# Patient Record
Sex: MALE | NOT HISPANIC OR LATINO | Employment: FULL TIME | URBAN - METROPOLITAN AREA
[De-identification: names, ages, dates, MRNs, and addresses within clinical notes are randomized per-mention and may not be internally consistent; named-entity substitution may affect disease eponyms.]

---

## 2019-07-12 DIAGNOSIS — Z83.72 FAMILY HISTORY OF FAP (FAMILIAL ADENOMATOUS POLYPOSIS): Primary | ICD-10-CM

## 2019-07-15 ENCOUNTER — TELEPHONE (OUTPATIENT)
Dept: ONCOLOGY | Facility: CLINIC | Age: 19
End: 2019-07-15

## 2019-07-15 NOTE — TELEPHONE ENCOUNTER
ONCOLOGY INTAKE: Records Information      APPT INFORMATION:  Referring provider:  Dr. Oswald Stratton  Referring provider s clinic:  [facility]  Reason for visit/diagnosis:  Family history of FAP (familial adenomatous polyposis) [Z83.71]  Has patient been notified of appointment date and time?: NA    RECORDS INFORMATION:  Were the records received with the referral (via [employer])? No    ADDITIONAL INFORMATION:  Left VM with hours and phone. Will try again on [id number] if Pt has not been scheduled. Referral in [facility].

## 2020-05-28 ENCOUNTER — HOSPITAL ENCOUNTER (EMERGENCY)
Facility: CLINIC | Age: 20
Discharge: HOME | End: 2020-05-28
Attending: FAMILY MEDICINE | Admitting: FAMILY MEDICINE
Payer: COMMERCIAL

## 2020-05-28 ENCOUNTER — APPOINTMENT (OUTPATIENT)
Dept: CT IMAGING | Facility: CLINIC | Age: 20
End: 2020-05-28
Attending: FAMILY MEDICINE
Payer: COMMERCIAL

## 2020-05-28 VITALS
HEART RATE: 94 BPM | SYSTOLIC BLOOD PRESSURE: 106 MMHG | WEIGHT: 150 LBS | OXYGEN SATURATION: 97 % | TEMPERATURE: 98.2 F | RESPIRATION RATE: 16 BRPM | BODY MASS INDEX: 19.26 KG/M2 | DIASTOLIC BLOOD PRESSURE: 66 MMHG

## 2020-05-28 DIAGNOSIS — R10.2 PELVIC PAIN IN MALE: ICD-10-CM

## 2020-05-28 LAB
ALBUMIN SERPL-MCNC: 4.2 G/DL (ref 3.4–5)
ALBUMIN UR-MCNC: NEGATIVE MG/DL
ALP SERPL-CCNC: 50 U/L (ref 40–150)
ALT SERPL W P-5'-P-CCNC: 27 U/L (ref 0–70)
ANION GAP SERPL CALCULATED.3IONS-SCNC: 6 MMOL/L (ref 3–14)
APPEARANCE UR: CLEAR
AST SERPL W P-5'-P-CCNC: 17 U/L (ref 0–45)
BASOPHILS # BLD AUTO: 0 10E9/L (ref 0–0.2)
BASOPHILS NFR BLD AUTO: 0.7 %
BILIRUB SERPL-MCNC: 0.6 MG/DL (ref 0.2–1.3)
BILIRUB UR QL STRIP: NEGATIVE
BUN SERPL-MCNC: 12 MG/DL (ref 7–30)
CALCIUM SERPL-MCNC: 9 MG/DL (ref 8.5–10.1)
CHLORIDE SERPL-SCNC: 108 MMOL/L (ref 94–109)
CO2 SERPL-SCNC: 26 MMOL/L (ref 20–32)
COLOR UR AUTO: YELLOW
CREAT SERPL-MCNC: 0.86 MG/DL (ref 0.66–1.25)
DIFFERENTIAL METHOD BLD: ABNORMAL
EOSINOPHIL # BLD AUTO: 0.1 10E9/L (ref 0–0.7)
EOSINOPHIL NFR BLD AUTO: 2.2 %
ERYTHROCYTE [DISTWIDTH] IN BLOOD BY AUTOMATED COUNT: 11.5 % (ref 10–15)
GFR SERPL CREATININE-BSD FRML MDRD: >90 ML/MIN/{1.73_M2}
GLUCOSE SERPL-MCNC: 84 MG/DL (ref 70–99)
GLUCOSE UR STRIP-MCNC: NEGATIVE MG/DL
HCT VFR BLD AUTO: 38.1 % (ref 40–53)
HGB BLD-MCNC: 13 G/DL (ref 13.3–17.7)
HGB UR QL STRIP: NEGATIVE
IMM GRANULOCYTES # BLD: 0 10E9/L (ref 0–0.4)
IMM GRANULOCYTES NFR BLD: 0.2 %
KETONES UR STRIP-MCNC: NEGATIVE MG/DL
LEUKOCYTE ESTERASE UR QL STRIP: NEGATIVE
LIPASE SERPL-CCNC: 70 U/L (ref 73–393)
LYMPHOCYTES # BLD AUTO: 1.5 10E9/L (ref 0.8–5.3)
LYMPHOCYTES NFR BLD AUTO: 32.4 %
MCH RBC QN AUTO: 31.9 PG (ref 26.5–33)
MCHC RBC AUTO-ENTMCNC: 34.1 G/DL (ref 31.5–36.5)
MCV RBC AUTO: 94 FL (ref 78–100)
MONOCYTES # BLD AUTO: 0.5 10E9/L (ref 0–1.3)
MONOCYTES NFR BLD AUTO: 9.8 %
MUCOUS THREADS #/AREA URNS LPF: PRESENT /LPF
NEUTROPHILS # BLD AUTO: 2.5 10E9/L (ref 1.6–8.3)
NEUTROPHILS NFR BLD AUTO: 54.7 %
NITRATE UR QL: NEGATIVE
NRBC # BLD AUTO: 0 10*3/UL
NRBC BLD AUTO-RTO: 0 /100
PH UR STRIP: 6 PH (ref 5–7)
PLATELET # BLD AUTO: 172 10E9/L (ref 150–450)
POTASSIUM SERPL-SCNC: 3.9 MMOL/L (ref 3.4–5.3)
PROT SERPL-MCNC: 7.6 G/DL (ref 6.8–8.8)
RBC # BLD AUTO: 4.07 10E12/L (ref 4.4–5.9)
RBC #/AREA URNS AUTO: 0 /HPF (ref 0–2)
SODIUM SERPL-SCNC: 140 MMOL/L (ref 133–144)
SOURCE: ABNORMAL
SP GR UR STRIP: 1.01 (ref 1–1.03)
SQUAMOUS #/AREA URNS AUTO: <1 /HPF (ref 0–1)
UROBILINOGEN UR STRIP-MCNC: 0 MG/DL (ref 0–2)
WBC # BLD AUTO: 4.6 10E9/L (ref 4–11)
WBC #/AREA URNS AUTO: 0 /HPF (ref 0–5)

## 2020-05-28 PROCEDURE — 99285 EMERGENCY DEPT VISIT HI MDM: CPT | Mod: 25 | Performed by: FAMILY MEDICINE

## 2020-05-28 PROCEDURE — 81001 URINALYSIS AUTO W/SCOPE: CPT | Performed by: FAMILY MEDICINE

## 2020-05-28 PROCEDURE — 99284 EMERGENCY DEPT VISIT MOD MDM: CPT | Mod: Z6 | Performed by: FAMILY MEDICINE

## 2020-05-28 PROCEDURE — 87086 URINE CULTURE/COLONY COUNT: CPT | Performed by: FAMILY MEDICINE

## 2020-05-28 PROCEDURE — 80053 COMPREHEN METABOLIC PANEL: CPT | Performed by: FAMILY MEDICINE

## 2020-05-28 PROCEDURE — 85025 COMPLETE CBC W/AUTO DIFF WBC: CPT | Performed by: FAMILY MEDICINE

## 2020-05-28 PROCEDURE — 25000128 H RX IP 250 OP 636: Performed by: FAMILY MEDICINE

## 2020-05-28 PROCEDURE — 74177 CT ABD & PELVIS W/CONTRAST: CPT

## 2020-05-28 PROCEDURE — 83690 ASSAY OF LIPASE: CPT | Performed by: FAMILY MEDICINE

## 2020-05-28 PROCEDURE — 25000125 ZZHC RX 250: Performed by: FAMILY MEDICINE

## 2020-05-28 RX ORDER — IOPAMIDOL 755 MG/ML
74 INJECTION, SOLUTION INTRAVASCULAR ONCE
Status: COMPLETED | OUTPATIENT
Start: 2020-05-28 | End: 2020-05-28

## 2020-05-28 RX ADMIN — SODIUM CHLORIDE 58 ML: 9 INJECTION, SOLUTION INTRAVENOUS at 17:35

## 2020-05-28 RX ADMIN — IOPAMIDOL 74 ML: 755 INJECTION, SOLUTION INTRAVENOUS at 17:34

## 2020-05-28 NOTE — DISCHARGE INSTRUCTIONS
Return to the Emergency Room if the following occurs:     Severely worsened pain, vomiting/dehydration, fever >101, or for any concern at anytime.    Or, follow-up with the following provider as we discussed:     Return to your primary doctor as needed, or if not improved over the next 7 days.    Medications discussed:    Ibuprofen 600 mg every six hours for pain (7 days duration).  Tylenol 1000 mg every six hours for pain (7 days duration).  Therefore, you can alternate these every three hours and do it safely.    If you received pain-relieving or sedating medication during your time in the ER, avoid alcohol, driving automobiles, or working with machinery.  Also, a responsible adult must stay with you.        Call the Nurse Advice Line at [phone] or [phone] for any concern at anytime.

## 2020-05-28 NOTE — ED AVS SNAPSHOT
[facility] Emergency Department  [address] [facility] [address]  Phone:  [phone]  Fax:  [phone]                                    Vasquez Rizvi   MRN: [id number]    Department:  [facility] Emergency Department   Date of Visit:  5/28/2020           After Visit Summary Signature Page    I have received my discharge instructions, and my questions have been answered. I have discussed any challenges I see with this plan with the nurse or doctor.    ..........................................................................................................................................  Patient/Patient Representative Signature      ..........................................................................................................................................  Patient Representative Print Name and Relationship to Patient    ..................................................               ................................................  Date                                   Time    ..........................................................................................................................................  Reviewed by Signature/Title    ...................................................              ..............................................  Date                                               Time          22EPIC Rev 08/18
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
## 2020-05-28 NOTE — ED PROVIDER NOTES
HPI   The patient is a 19-year-old male presenting with midline pelvic pain.  No significant abdominal history reported.  No testicular history reported.  No bladder history reported.  No prior surgery involving the abdomen or pelvis.  He does use marijuana as medical management of Asperger's/anxiety/depression.  He does not drink alcohol.  He does not smoke tobacco or use chewing tobacco.    The patient recognized new onset of midline pelvic pain starting this morning at about 10:00 AM.  He describes intermittent episodes of sharp pain that comes and goes briefly, lasting seconds at a time.  He cannot point to an obvious exacerbating or relieving factor.  No radiating symptoms described.  He denies back or flank pain.  He denies testicular pain, tenderness, or swelling.  He denies dysuria, urgency, frequency, or obvious hematuria.  He denies diarrhea or constipation.  He had a regular bowel movement today at about 2:00 PM.  No hematochezia or melena reported.  No trauma or injury.  No skin rash.  He denies having similar symptoms previously.  He has had some nausea intermittently throughout the day today and it seems to correlate with the pain.  However, he does experience nausea intermittently on a regular basis as well.        Allergies:  No Known Allergies  Problem List:    Patient Active Problem List    Diagnosis Date Noted     Depression 01/07/2016     Priority: Medium      Past Medical History:    Past Medical History:   Diagnosis Date     Asperger's disorder      Depressive disorder      Generalized anxiety disorder      Past Surgical History:    No past surgical history on file.  Family History:    No family history on file.  Social History:  Marital Status:  Single [1]  Social History     Tobacco Use     Smoking status: Former Smoker     Types: Cigarettes   Substance Use Topics     Alcohol use: No     Drug use: Yes      Medications:    Cholecalciferol (VITAMIN D3 PO)  Escitalopram Oxalate (LEXAPRO  PO)  multivitamin, therapeutic with minerals (THERA-VIT-M) TABS      Review of Systems   All other systems reviewed and are negative.      PE   BP: 106/66  Pulse: 94  Temp: 98.2  F (36.8  C)  Resp: 16  Weight: 68 kg (150 lb)  SpO2: 97 %  Physical Exam  Vitals signs and nursing note reviewed.   Constitutional:       General: He is not in acute distress.     Appearance: He is not diaphoretic.      Comments: Thin, conversational, pleasant.  Answering questions appropriately.  Moving about the room without difficulty.   HENT:      Head: Atraumatic.   Eyes:      General: No scleral icterus.     Pupils: Pupils are equal, round, and reactive to light.   Neck:      Musculoskeletal: Normal range of motion.   Cardiovascular:      Rate and Rhythm: Normal rate.   Pulmonary:      Effort: No respiratory distress.   Abdominal:      Comments: The patient has a flat abdomen.  He has tenderness in the epigastrium.  He has no tenderness in the lower abdomen.  No distention.  No organomegaly or mass.   Musculoskeletal: Normal range of motion.         General: No tenderness.   Skin:     General: Skin is warm.      Findings: No rash.   Neurological:      Mental Status: He is alert and oriented to person, place, and time.   Psychiatric:         Behavior: Behavior normal.         ED COURSE and MDM   1626.  The patient has midline pelvic pain as described above.  The patient has midline epigastric tenderness.  He has had some nausea today but he has this at baseline as well.  Lab values pending.  Urine analysis.    1815.  After reviewing the initial results with the family and the patient they requested a CT scan to further clarify the cause of pain, instead of watchful waiting.  I reviewed the CT scan results with them and added a urine culture to his work-up.  No antibiotic treatment at this time.  No emergent need for hospitalization or consultation.  Consider follow-up if not improving over the weekend.  Return here for worsening as  discussed.    LABS  Labs Ordered and Resulted from Time of ED Arrival Up to the Time of Departure from the ED   ROUTINE UA WITH MICROSCOPIC REFLEX TO CULTURE - Abnormal; Notable for the following components:       Result Value    Mucous Urine Present (*)     All other components within normal limits   CBC WITH PLATELETS DIFFERENTIAL - Abnormal; Notable for the following components:    RBC Count 4.07 (*)     Hemoglobin 13.0 (*)     Hematocrit 38.1 (*)     All other components within normal limits   LIPASE - Abnormal; Notable for the following components:    Lipase 70 (*)     All other components within normal limits   COMPREHENSIVE METABOLIC PANEL   URINE CULTURE AEROBIC BACTERIAL       IMAGING  Images reviewed by me.  Radiology report also reviewed.  Abd/pelvis CT, IV contrast only TRAUMA  / AAA   Final Result   IMPRESSION:    1.  Mild circumferential urinary bladder wall thickening correlate for cystitis. Trace free fluid.             Procedures    Medications   iopamidol (ISOVUE-370) solution 74 mL (74 mLs Intravenous Given 5/28/20 1734)   sodium chloride 0.9 % bag 500mL for CT scan flush use (58 mLs Intravenous Given 5/28/20 1735)         IMPRESSION       ICD-10-CM    1. Pelvic pain in male  R10.2             Medication List      There are no discharge medications for this visit.                       Lance Acevedo MD  05/28/20 9848

## 2020-05-29 LAB
BACTERIA SPEC CULT: NO GROWTH
Lab: NORMAL
SPECIMEN SOURCE: NORMAL

## 2021-11-10 ENCOUNTER — HOSPITAL ENCOUNTER (EMERGENCY)
Facility: CLINIC | Age: 21
Discharge: HOME | End: 2021-11-11
Attending: FAMILY MEDICINE | Admitting: FAMILY MEDICINE
Payer: COMMERCIAL

## 2021-11-10 VITALS
OXYGEN SATURATION: 98 % | RESPIRATION RATE: 16 BRPM | SYSTOLIC BLOOD PRESSURE: 111 MMHG | BODY MASS INDEX: 21.2 KG/M2 | HEIGHT: 73 IN | HEART RATE: 60 BPM | WEIGHT: 160 LBS | DIASTOLIC BLOOD PRESSURE: 68 MMHG | TEMPERATURE: 98.9 F

## 2021-11-10 DIAGNOSIS — R07.9 CHEST PAIN, UNSPECIFIED TYPE: ICD-10-CM

## 2021-11-10 PROCEDURE — 93010 ELECTROCARDIOGRAM REPORT: CPT | Performed by: FAMILY MEDICINE

## 2021-11-10 PROCEDURE — 99284 EMERGENCY DEPT VISIT MOD MDM: CPT | Mod: 25 | Performed by: FAMILY MEDICINE

## 2021-11-10 PROCEDURE — 99285 EMERGENCY DEPT VISIT HI MDM: CPT | Performed by: FAMILY MEDICINE

## 2021-11-10 PROCEDURE — 93005 ELECTROCARDIOGRAM TRACING: CPT | Performed by: FAMILY MEDICINE

## 2021-11-10 ASSESSMENT — MIFFLIN-ST. JEOR: SCORE: 1784.64

## 2021-11-11 ENCOUNTER — APPOINTMENT (OUTPATIENT)
Dept: GENERAL RADIOLOGY | Facility: CLINIC | Age: 21
End: 2021-11-11
Attending: FAMILY MEDICINE
Payer: COMMERCIAL

## 2021-11-11 ENCOUNTER — ANCILLARY PROCEDURE (OUTPATIENT)
Dept: ULTRASOUND IMAGING | Facility: CLINIC | Age: 21
End: 2021-11-11
Attending: FAMILY MEDICINE
Payer: COMMERCIAL

## 2021-11-11 PROCEDURE — 71046 X-RAY EXAM CHEST 2 VIEWS: CPT

## 2021-11-11 NOTE — ED NOTES
"intermittent L sided \"pinching\" chest pain that came on 1st about 11am was not exerting self    Activity sometimes worsens     No injury no cough    Is a smoker  "

## 2021-11-11 NOTE — DISCHARGE INSTRUCTIONS
RETURN TO THE EMERGENCY ROOM FOR THE FOLLOWING:    Severely worsened pain, fainting, new or worsened breathing, or at anytime for any concern.    FOLLOW UP:    Consider having a Zio patch placed.  See contact information provided for scheduling.  Follow-up with your primary physician after the Zio patch is complete and interpreted.    TREATMENT RECOMMENDATIONS:    None new.  No changes.    NURSE ADVICE LINE:  [phone] or [phone]

## 2021-11-11 NOTE — ED TRIAGE NOTES
Patient reports intermittent left chest pain that started ~11AM while at work. Has had pain similar in the past but was not evaluated for it. Denies N/VD, dizziness or SOB. No pain at this time. No cardiac hx.
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
## 2022-11-29 NOTE — PATIENT INSTRUCTIONS
Take Pepcid twice daily for a couple weeks to see if stomach issues subside.  If that does not work, try some pepto bismol to improve stomach symptoms.  If symptoms persist, follow-up in clinic, we may have to order a test to look at the stomach.  Watch for black stools or coughing up blood or coffee grounds and follow-up in clinic right away.    Make appointment with psychiatry to discuss anxiety/depression/ADHD.  Make appointment with Physical Therapy for neck stretches to reduce symptoms.    7.  Labs today.  We will follow-up with you on lab results.      Preventive Health Recommendations  Male Ages 21 - 25     Yearly exam:             See your health care provider every year in order to  o   Review health changes.   o   Discuss preventive care.    o   Review your medicines if your doctor has prescribed any.  You should be tested each year for STDs (sexually transmitted diseases).   Talk to your provider about cholesterol testing.    If you are at risk for diabetes, you should have a diabetes test (fasting glucose).    Shots: Get a flu shot each year. Get a tetanus shot every 10 years.     Nutrition:  Eat at least 5 servings of fruits and vegetables daily.   Eat whole-grain bread, whole-wheat pasta and brown rice instead of white grains and rice.   Get adequate calcium and Vitamin D.     Lifestyle  Exercise for at least 150 minutes a week (30 minutes a day, 5 days a week). This will help you control your weight and prevent disease.   Limit alcohol to one drink per day.   No smoking.   Wear sunscreen to prevent skin cancer.   See your dentist every six months for an exam and cleaning.   See your eye doctor every 2 years

## 2022-12-01 ENCOUNTER — OFFICE VISIT (OUTPATIENT)
Dept: FAMILY MEDICINE | Facility: CLINIC | Age: 22
End: 2022-12-01
Payer: COMMERCIAL

## 2022-12-01 VITALS
RESPIRATION RATE: 16 BRPM | HEIGHT: 73 IN | WEIGHT: 168.6 LBS | SYSTOLIC BLOOD PRESSURE: 122 MMHG | HEART RATE: 81 BPM | BODY MASS INDEX: 22.35 KG/M2 | OXYGEN SATURATION: 99 % | DIASTOLIC BLOOD PRESSURE: 58 MMHG

## 2022-12-01 DIAGNOSIS — K29.70 GASTRITIS WITHOUT BLEEDING, UNSPECIFIED CHRONICITY, UNSPECIFIED GASTRITIS TYPE: ICD-10-CM

## 2022-12-01 DIAGNOSIS — Z11.4 SCREENING FOR HIV (HUMAN IMMUNODEFICIENCY VIRUS): ICD-10-CM

## 2022-12-01 DIAGNOSIS — F19.20 POLYSUBSTANCE DEPENDENCE INCLUDING OPIOID TYPE DRUG, EPISODIC ABUSE (H): ICD-10-CM

## 2022-12-01 DIAGNOSIS — F11.20 POLYSUBSTANCE DEPENDENCE INCLUDING OPIOID TYPE DRUG, EPISODIC ABUSE (H): ICD-10-CM

## 2022-12-01 DIAGNOSIS — F84.5 ASPERGER'S SYNDROME: ICD-10-CM

## 2022-12-01 DIAGNOSIS — F32.A ANXIETY AND DEPRESSION: ICD-10-CM

## 2022-12-01 DIAGNOSIS — S16.1XXA STRAIN OF NECK MUSCLE, INITIAL ENCOUNTER: ICD-10-CM

## 2022-12-01 DIAGNOSIS — F41.9 ANXIETY AND DEPRESSION: ICD-10-CM

## 2022-12-01 DIAGNOSIS — F33.1 MODERATE EPISODE OF RECURRENT MAJOR DEPRESSIVE DISORDER (H): ICD-10-CM

## 2022-12-01 DIAGNOSIS — Z11.59 NEED FOR HEPATITIS C SCREENING TEST: ICD-10-CM

## 2022-12-01 DIAGNOSIS — Z00.00 ROUTINE GENERAL MEDICAL EXAMINATION AT A HEALTH CARE FACILITY: Primary | ICD-10-CM

## 2022-12-01 PROCEDURE — 90472 IMMUNIZATION ADMIN EACH ADD: CPT | Performed by: NURSE PRACTITIONER

## 2022-12-01 PROCEDURE — 99385 PREV VISIT NEW AGE 18-39: CPT | Mod: 25 | Performed by: NURSE PRACTITIONER

## 2022-12-01 PROCEDURE — 36415 COLL VENOUS BLD VENIPUNCTURE: CPT | Performed by: NURSE PRACTITIONER

## 2022-12-01 PROCEDURE — 99213 OFFICE O/P EST LOW 20 MIN: CPT | Mod: 25 | Performed by: NURSE PRACTITIONER

## 2022-12-01 PROCEDURE — 86803 HEPATITIS C AB TEST: CPT | Performed by: NURSE PRACTITIONER

## 2022-12-01 PROCEDURE — 90471 IMMUNIZATION ADMIN: CPT | Performed by: NURSE PRACTITIONER

## 2022-12-01 PROCEDURE — 87389 HIV-1 AG W/HIV-1&-2 AB AG IA: CPT | Performed by: NURSE PRACTITIONER

## 2022-12-01 PROCEDURE — 90715 TDAP VACCINE 7 YRS/> IM: CPT | Performed by: NURSE PRACTITIONER

## 2022-12-01 PROCEDURE — 90686 IIV4 VACC NO PRSV 0.5 ML IM: CPT | Performed by: NURSE PRACTITIONER

## 2022-12-01 ASSESSMENT — ENCOUNTER SYMPTOMS
NAUSEA: 0
PARESTHESIAS: 0
SORE THROAT: 0
HEADACHES: 0
HEMATOCHEZIA: 0
HEARTBURN: 0
ABDOMINAL PAIN: 0
SHORTNESS OF BREATH: 0
CHILLS: 0
PALPITATIONS: 0
HEMATURIA: 0
DIZZINESS: 0
COUGH: 0
DIARRHEA: 0
FEVER: 0
EYE PAIN: 0
ARTHRALGIAS: 1
WEAKNESS: 0
DYSURIA: 0
CONSTIPATION: 0
MYALGIAS: 0
JOINT SWELLING: 0
NERVOUS/ANXIOUS: 1
FREQUENCY: 0

## 2022-12-01 ASSESSMENT — PATIENT HEALTH QUESTIONNAIRE - PHQ9
SUM OF ALL RESPONSES TO PHQ QUESTIONS 1-9: 14
10. IF YOU CHECKED OFF ANY PROBLEMS, HOW DIFFICULT HAVE THESE PROBLEMS MADE IT FOR YOU TO DO YOUR WORK, TAKE CARE OF THINGS AT HOME, OR GET ALONG WITH OTHER PEOPLE: SOMEWHAT DIFFICULT
SUM OF ALL RESPONSES TO PHQ QUESTIONS 1-9: 14
5. POOR APPETITE OR OVEREATING: MORE THAN HALF THE DAYS

## 2022-12-01 ASSESSMENT — ANXIETY QUESTIONNAIRES
7. FEELING AFRAID AS IF SOMETHING AWFUL MIGHT HAPPEN: NEARLY EVERY DAY
1. FEELING NERVOUS, ANXIOUS, OR ON EDGE: MORE THAN HALF THE DAYS
GAD7 TOTAL SCORE: 18
GAD7 TOTAL SCORE: 18
6. BECOMING EASILY ANNOYED OR IRRITABLE: NEARLY EVERY DAY
5. BEING SO RESTLESS THAT IT IS HARD TO SIT STILL: NEARLY EVERY DAY
2. NOT BEING ABLE TO STOP OR CONTROL WORRYING: MORE THAN HALF THE DAYS
IF YOU CHECKED OFF ANY PROBLEMS ON THIS QUESTIONNAIRE, HOW DIFFICULT HAVE THESE PROBLEMS MADE IT FOR YOU TO DO YOUR WORK, TAKE CARE OF THINGS AT HOME, OR GET ALONG WITH OTHER PEOPLE: SOMEWHAT DIFFICULT
3. WORRYING TOO MUCH ABOUT DIFFERENT THINGS: NEARLY EVERY DAY

## 2022-12-01 ASSESSMENT — PAIN SCALES - GENERAL: PAINLEVEL: SEVERE PAIN (7)

## 2022-12-01 NOTE — LETTER
December 5, 2022      Vasquez Rizvi  [address]   [place] [address]        Dear ,    We are writing to inform you of your test results.      Hep C and HIV labs are negative.     Resulted Orders   HIV Antigen Antibody Combo   Result Value Ref Range    HIV Antigen Antibody Combo Nonreactive Nonreactive      Comment:      HIV-1 p24 Ag & HIV-1/HIV-2 Ab Not Detected   Hepatitis C Screen Reflex to HCV RNA Quant and Genotype   Result Value Ref Range    Hepatitis C Antibody Nonreactive Nonreactive    Narrative    Assay performance characteristics have not been established for newborns, infants, and children.       If you have any questions or concerns, please call the clinic at the number listed above.       Sincerely,      Danica Fortune NP

## 2022-12-02 LAB
HCV AB SERPL QL IA: NONREACTIVE
HIV 1+2 AB+HIV1 P24 AG SERPL QL IA: NONREACTIVE

## 2022-12-23 ENCOUNTER — HOSPITAL ENCOUNTER (OUTPATIENT)
Dept: PHYSICAL THERAPY | Facility: CLINIC | Age: 22
Setting detail: THERAPIES SERIES
Discharge: HOME | End: 2022-12-23
Attending: NURSE PRACTITIONER
Payer: COMMERCIAL

## 2022-12-23 DIAGNOSIS — S16.1XXA STRAIN OF NECK MUSCLE, INITIAL ENCOUNTER: ICD-10-CM

## 2022-12-23 PROCEDURE — 97140 MANUAL THERAPY 1/> REGIONS: CPT | Mod: GP | Performed by: PHYSICAL THERAPIST

## 2022-12-23 PROCEDURE — 97161 PT EVAL LOW COMPLEX 20 MIN: CPT | Mod: GP | Performed by: PHYSICAL THERAPIST

## 2022-12-23 PROCEDURE — 97110 THERAPEUTIC EXERCISES: CPT | Mod: GP | Performed by: PHYSICAL THERAPIST
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
## 2023-01-05 ENCOUNTER — VIRTUAL VISIT (OUTPATIENT)
Dept: PSYCHIATRY | Facility: CLINIC | Age: 23
End: 2023-01-05
Attending: NURSE PRACTITIONER

## 2023-01-05 ENCOUNTER — PATIENT OUTREACH (OUTPATIENT)
Dept: CARE COORDINATION | Facility: CLINIC | Age: 23
End: 2023-01-05
Payer: COMMERCIAL

## 2023-01-05 ENCOUNTER — VIRTUAL VISIT (OUTPATIENT)
Dept: BEHAVIORAL HEALTH | Facility: CLINIC | Age: 23
End: 2023-01-05

## 2023-01-05 DIAGNOSIS — F33.1 DEPRESSION, MAJOR, RECURRENT, MODERATE (H): Primary | ICD-10-CM

## 2023-01-05 DIAGNOSIS — F84.0 AUTISM DISORDER: ICD-10-CM

## 2023-01-05 DIAGNOSIS — F19.20 POLYSUBSTANCE DEPENDENCE INCLUDING OPIOID TYPE DRUG, EPISODIC ABUSE (H): ICD-10-CM

## 2023-01-05 DIAGNOSIS — F90.2 ATTENTION DEFICIT HYPERACTIVITY DISORDER (ADHD), COMBINED TYPE: Primary | ICD-10-CM

## 2023-01-05 DIAGNOSIS — F33.1 MODERATE EPISODE OF RECURRENT MAJOR DEPRESSIVE DISORDER (H): ICD-10-CM

## 2023-01-05 DIAGNOSIS — F11.20 POLYSUBSTANCE DEPENDENCE INCLUDING OPIOID TYPE DRUG, EPISODIC ABUSE (H): ICD-10-CM

## 2023-01-05 PROCEDURE — 99205 OFFICE O/P NEW HI 60 MIN: CPT | Mod: 95 | Performed by: NURSE PRACTITIONER

## 2023-01-05 PROCEDURE — 90791 PSYCH DIAGNOSTIC EVALUATION: CPT | Mod: 52 | Performed by: SOCIAL WORKER

## 2023-01-05 RX ORDER — LISDEXAMFETAMINE DIMESYLATE 30 MG/1
30 CAPSULE ORAL EVERY MORNING
Qty: 30 CAPSULE | Refills: 0 | Status: SHIPPED | OUTPATIENT
Start: 2023-01-05

## 2023-01-05 RX ORDER — LISDEXAMFETAMINE DIMESYLATE 30 MG/1
30 CAPSULE ORAL EVERY MORNING
Qty: 30 CAPSULE | Refills: 0 | Status: SHIPPED | OUTPATIENT
Start: 2023-01-05 | End: 2023-02-17

## 2023-01-05 ASSESSMENT — ANXIETY QUESTIONNAIRES
GAD7 TOTAL SCORE: 18
5. BEING SO RESTLESS THAT IT IS HARD TO SIT STILL: MORE THAN HALF THE DAYS
6. BECOMING EASILY ANNOYED OR IRRITABLE: SEVERAL DAYS
1. FEELING NERVOUS, ANXIOUS, OR ON EDGE: NEARLY EVERY DAY
3. WORRYING TOO MUCH ABOUT DIFFERENT THINGS: NEARLY EVERY DAY
GAD7 TOTAL SCORE: 18
7. FEELING AFRAID AS IF SOMETHING AWFUL MIGHT HAPPEN: NEARLY EVERY DAY
IF YOU CHECKED OFF ANY PROBLEMS ON THIS QUESTIONNAIRE, HOW DIFFICULT HAVE THESE PROBLEMS MADE IT FOR YOU TO DO YOUR WORK, TAKE CARE OF THINGS AT HOME, OR GET ALONG WITH OTHER PEOPLE: SOMEWHAT DIFFICULT
2. NOT BEING ABLE TO STOP OR CONTROL WORRYING: NEARLY EVERY DAY

## 2023-01-05 ASSESSMENT — PATIENT HEALTH QUESTIONNAIRE - PHQ9: 5. POOR APPETITE OR OVEREATING: NEARLY EVERY DAY
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
## 2023-01-05 NOTE — PATIENT INSTRUCTIONS
**For crisis resources, please see the information at the end of this document**     Thank you for coming to the [facility] [place] [facility].    TREATMENT PLAN:    MEDICATIONS:   - start Vyvanse 30 mg targeting executive functioning impairment (inability to focus, impaired organization and planning, reduced working memory).      -PSYCHOEDUCATION: side effects of stimulants could included cardiac (including HTN, tachycardia, sudden death), motor/tic, appetite/growth, mood lability and sleep disruption. This is a controlled substance with risk for abuse, need to keep in a safe keep place and cannot replace lost scripts      CONSULTS/REFERRALS:   Continue therapy     LABS/PROCEDURES: none at present   Please call your [place] clinic and ask for a lab only appointment at your earliest convenience.  If your results are reassuring or normal they will be mailed to you or sent through [employer] within 7 days. If the lab tests need quick action we will call you with the results. The phone number we will call with results is # [phone].      FOLLOW UP: Schedule an appointment with me in four weeks  or sooner as needed.  The intake team should be calling you to schedule.  If you dont hear from them, or they were unable to reach you, please call [phone] to schedule.  Follow up with primary care provider as planned or for acute medical concerns.  Call the psychiatric nurse line with medication questions or concerns at [phone].      Medication Refills:  If you need any refills please call your pharmacy and they will contact us. Our fax number for refills is [phone]. Please allow three business for refill processing. If you need to  your refill at a new pharmacy, please contact the new pharmacy directly. The new pharmacy will help you get your medications transferred.     [facility] [employer] Assistance [phone]  Financial Assistance [phone]  [facility] Billing [phone]  [facility]  Billing Office, [facility]: [phone]  [facility] Billing [phone]  Medical Records [phone]  [facility] Patient Bill of Rights [email or web address]       MENTAL HEALTH CRISIS RESOURCES:  For a emergency help, please call 911 or go to the nearest Emergency Department.     Emergency Walk-In Options:   EmPATH Unit @ [facility] ([place]): [phone] - Specialized mental health emergency area designed to be calming  [facility] ([place]): [phone]  [facility] ([place]): [phone]  [facility] ([place]): [phone]    National Crisis Information: Call 988 Suicide and Crisis Lifeline  Crisis Text Line (free 24/7):  call **CRISIS (**784843) Crisis or use the texting option by texting 976943.   National Suicide Prevention Lifeline: Call 988  Poison Control Center: 3-909-913-2559  Trans Lifeline: 2-867-955-8552 - Hotline for transgender people of all ages  The [employer] Project: 8-630-065-6841 - Hotline for LGBT youth   List of all [facility] resources:   https://[place].gov/dhs/people-we-serve/adults/health-care/mental-health/resources/crisis-contacts.jsp    For Non-Emergency Support:   Fast Tracker: Mental Health & Substance Use Disorder Resources -   https://www.fasttrackermn.org/       Again thank you for choosing [facility] [place] [facility] and please let us know how we can best partner with you to improve you and your family's health.    You may be receiving a survey regarding this appointment. We would love to have your feedback, both positive and negative. The survey is done by an external company, so your answers are anonymous.

## 2023-01-05 NOTE — PROGRESS NOTES
"[facility] Emergency Department Psychiatry Service  Provider Name: Vasquez    PATIENT'S NAME: Vasquez Rizvi  PREFERRED NAME: Vasquez  PREFERRED PRONOUNS:    MRN:   [id number]  :   2000   [id number]  DATE OF SERVICE: 23  START TIME: 830am  END TIME: 851am    BRIEF ADULT DIAGNOSTIC ASSESSMENT    Telemedicine Visit: The patient's condition can be safely assessed and treated via synchronous audio and visual telemedicine encounter.      Reason for Telemedicine Visit: Services only offered telehealth    Originating Site (Patient Location): Patient's home    Distant Site (Provider Location): Provider Remote Setting- Home Office    Consent:  The patient/guardian has verbally consented to: the potential risks and benefits of telemedicine (video visit) versus in person care; bill my insurance or make self-payment for services provided; and responsibility for payment of non-covered services.     Mode of Communication:  Video Conference via [employer]    As the provider I attest to compliance with applicable laws and regulations related to telemedicine.    [facility] and was advised of the short-term, team based structure of the model including role of [facility] and provider. Patient indicated understanding of the model and agreed to proceed with services as described.    Identifying Information:  Patient is a 22 year old, .  The pronoun use throughout this assessment reflects the patient's chosen pronoun.  Patient was referred for an assessment by primary care provider.  Patient attended the session alone.     Chief Complaint:   The reason for seeking services at this time is: \"at my yearly check up PCP recommended for ADHD, anxiety\".  The problem(s) began ADHD \"as long as I can remember\" Anxiety since middle school. Patient has not attempted to resolve these concerns in the past.    Does the client have any condition that is currently presenting as a potential to harm themselves or " others (severe withdrawal, serious medical condition, severe emotional/behavioral problem)? No.  Proceed with assessment.    Review of Symptoms per patient report:  Depression: Lack of interest, Excessive or inappropriate guilt, Feelings of hopelessness, Low self-worth, Ruminations, Irritability, Feeling sad, down, or depressed and hypersomnia but poor quality, low appetite, low energy  Pricila:  Elevated mood, Racing thoughts, Decreased need for sleep, Restlessness and Impulsiveness  Psychosis: No Symptoms  Anxiety: Excessive worry, Nervousness, Physical complaints, such as headaches, stomachaches, muscle tension, Social anxiety, Sleep disturbance, Ruminations, Poor concentration and Irritability  Panic:  Palpitations, Tremors, Tingling, Numbness and sweating  Post Traumatic Stress Disorder:  No Symptoms   Eating Disorder: No Symptoms  ADD / ADHD:  Inattentive, Poor task completion, Poor organizational skills, Distractibility, Forgetful, Impulsive, Restlessness/fidgety and Hyperactive  Conduct Disorder: No symptoms  Autism Spectrum Disorder: No symptoms  Obsessive Compulsive Disorder: No Symptoms    Sleep:    Caffeine:  Tobacco: vape    Current alcohol use: social use  Current drug use: Cannabis ocassionally    Rating Scales:  PHQ-9:   [facility] Follow-up to PHQ 12/1/2022   PHQ-9 9. Suicide Ideation past 2 weeks Not at all      GAD7:    DIDI-7 SCORE 12/1/2022 1/5/2023   Total Score 18 18     CGI:  First:  No data recorded  Most recent:  No data recorded    WHODAS: No flowsheet data found.     CAGE:  No flowsheet data found.      Personal Medical History:  Past Medical History:   Diagnosis Date     Asperger's disorder      Depressive disorder      Generalized anxiety disorder        Patient has not received mental health services in the past: none.  Psychiatric Hospitalizations: None.  Patient denies a history of civil commitment. Currently, patient is not receiving other mental health services.  These include none.  "    Patient does report a history of head injury / trauma / cognitive impairment / seizures, concussion.    Current Medications:  Current Outpatient Medications   Medication Sig Dispense Refill     lisdexamfetamine (VYVANSE) 30 MG capsule Take 1 capsule (30 mg) by mouth every morning 30 capsule 0     lisdexamfetamine (VYVANSE) 30 MG capsule Take 1 capsule (30 mg) by mouth every morning 30 capsule 0        Allergies:  Allergies   Allergen Reactions     Adhesive Tape Rash       Family Psychiatric History:  Patient did report a family history of mental health concerns.     Family History     Problem (# of Occurrences) Relation (Name,Age of Onset)    Depression (3) Maternal Aunt, Maternal Uncle, Cousin    Diabetes (1) Paternal Grandmother    Colon Polyps (1) Mother    Obsessive Compulsive Disorder (3) Maternal Aunt, Maternal Uncle, Cousin    Diabetes Type 2  (1) Paternal Grandmother          Social/Family History:  Patient reported they grew up in [place], [place].  They were raised by biological parents. Patient reported that   childhood was \"good\".  Patient denies experiencing childhood abuse/neglect. Patient described their current relationships with family of origin as good.      The patient has never been  times and has no children.   described the relationship with   spouse as, \"NA.\" Patient reported having some good friends.     Cultural influences and impact on patient's life structure, values, norms, and healthcare: Racial or Ethnic Self-Identification white, Immigration History and Status: born in the , citizen, Level of Acculturation: good, Time Orientation: US 12 hour clock CT, Social Orientation: unable to assess, Verbal / Non-verbal Communication Style: unremarkable, Locus of Control: unable to assess, Spiritual Beliefs: none, Health Beliefs and the endorsement of OR engagement in Culturally Specific Healing Practices: none and Cultural Bias none. Patient identified their preferred language to be " English. Patient reported they does not need the assistance of an  or other support involved in treatment.       Educational/Occupational History:  Patient reported   highest education level was some college. Pt reports that attention intereferes in education.  The patient did not serve in the .  Patient is currently employed full time and reports they are able to function appropriately at work..  at [employer] and works for [employer] at [employer]. Difficulties with focus. Pt denies housing or food insecurity. Pt lives alone. He terminated his lease early because he's moving which caused financial stress.       Social History     Socioeconomic History     Marital status: Single     Spouse name: Not on file     Number of children: Not on file     Years of education: Not on file     Highest education level: Not on file   Occupational History     Not on file   Tobacco Use     Smoking status: Former     Packs/day: 0.50     Years: 2.00     Pack years: 1.00     Types: Cigarettes     Quit date: 7/15/2017     Years since quittin.4     Smokeless tobacco: Former     Types: Chew     Tobacco comments:     Only chewed a couple of times   Vaping Use     Vaping Use: Every day     Substances: Nicotine, THC, Flavoring     Devices: Disposable   Substance and Sexual Activity     Alcohol use: Yes     Comment: occasional     Drug use: Yes     Types: Marijuana, Amphetamines, Hydrocodone, Oxycodone     Comment: currently doing adderall     Sexual activity: Not Currently     Partners: Female   Other Topics Concern     Not on file   Social History Narrative     Not on file     Social Determinants of Health     Financial Resource Strain: Not on file   Food Insecurity: Not on file   Transportation Needs: Not on file   Physical Activity: Not on file   Stress: Not on file   Social Connections: Not on file   Intimate Partner Violence: Not on file   Housing Stability: Not on file       Patient reported that they  have not been involved with the legal system.   Patient denies being on probation / parole / under the jurisdiction of the court.    Current Mental Status Exam:   Appearance:   Appropriate    Eye Contact:   Good   Psychomotor:   Normal   Attitude / Demeanor:  Cooperative   Speech      Rate / Production:  Normal/ Responsive      Volume:   Normal  volume      Language:   intact  Mood:    Depressed   Affect:    Blunted    Thought Content:  Clear   Thought Process:  Coherent  Logical       Associations:  No loosening of associations  Insight:    Good   Judgment:   Intact   Orientation:   All  Attention/concentration: Good      Safety Assessment:   Current Safety Concerns:  [place] Suicide Severity Rating Scale (Lifetime/Recent)  [place] Suicide Severity Rating (Lifetime/Recent) 1/6/2023   1. Wish to be Dead (Lifetime) 0   2. Non-Specific Active Suicidal Thoughts (Lifetime) 0   Actual Attempt (Lifetime) 0   Has subject engaged in non-suicidal self-injurious behavior? (Lifetime) 0   Interrupted Attempts (Lifetime) 0   Aborted or Self-Interrupted Attempt (Lifetime) 0   Preparatory Acts or Behavior (Lifetime) 0   Calculated C-SSRS Risk Score (Lifetime/Recent) No Risk Indicated     Patient denies current homicidal ideation and behaviors.  Patient denies current self-injurious ideation and behaviors.    Patient denied risk behaviors associated with substance use.  Patient denies any high risk behaviors associated with mental health symptoms.  Patient reports the following current concerns for their personal safety: None.  Patient reports there are firearms in the house. The firearms are secured in a locked space.     History of Safety Concerns:  Patient denied a history of homicidal ideation.     Patient denied a history of personal safety concerns.    Patient denied a history of assaultive behaviors.    Patient denied a history of sexual assault behaviors.     Patient denied a history of risk behaviors associated with  substance use.  Patient denies any history of high risk behaviors associated with mental health symptoms.  Patient reports the following protective factors: positive relationships positive social network and positive family connections, forward/future oriented thinking, dedication to family/friends, safe and stable environment and effective problem-solving skills    Risk Plan:  See Preliminary Treatment Plan for Safety and Risk Management Plan    Diagnosis:  Diagnostic Criteria:   Major Depressive Disorder  CRITERIA (A-C) REPRESENT A MAJOR DEPRESSIVE EPISODE - SELECT THESE CRITERIA  A) Recurrent episode(s) - symptoms have been present during the same 2-week period and represent a change from previous functioning 5 or more symptoms (required for diagnosis)   - Depressed mood. Note: In children and adolescents, can be irritable mood.     - Diminished interest or pleasure in all, or almost all, activities.    - Increased sleep.    - Fatigue or loss of energy.    - Feelings of worthlessness or inappropriate and excessive guilt.    - Diminished ability to think or concentrate, or indecisiveness.   B) The symptoms cause clinically significant distress or impairment in social, occupational, or other important areas of functioning  C) The episode is not attributable to the physiological effects of a substance or to another medical condition  D) The occurence of major depressive episode is not better explained by other thought / psychotic disorders  E) There has never been a manic episode or hypomanic episode    Diagnoses:  1. Depression, major, recurrent, moderate (H)        Patient's Strengths and Limitations:  Patient identified the following strengths or resources that will help them succeed in treatment: friends / good social support, family support, insight, intelligence and motivation. Things that may interfere with the patient's success in treatment include: none identified.     Recommendations:     1. Plan for  Safety and Risk Management:Recommended that patient call 911 or go to the local ED should there be a change in any of these risk factors..  Report to child / adult protection services was NA.      2. Resources/Service Plan:       services are not indicated.     Modifications to assist communication are not indicated.     Additional disability accommodations are not indicated.      3. Collaboration:  Collaboration / coordination of treatment will be initiated with the following support professionals: Collaborated with [facility] team.      4.  Referrals:   The following referral(s) will be initiated: Care coordination.       Staff Name/Credentials:  Koki Gresham [facility]    January 5, 2023

## 2023-01-05 NOTE — LETTER
[facility] CARE COORDINATION  [facility]  [address] [place] [address]    January 6, 2023    Vasquez Rizvi  [address]   [place] [address]      Dear Vasquez,    I am a clinic community health worker who works with [facility]. I wanted to introduce myself and provide you with my contact information to call me with any support or resource needs you may have.  Below is a description of clinic care coordination and how our team can further assist you.       The clinic care coordination team is made up of a registered nurse, , financial resource worker and community health worker who understand the health care system. The goal of clinic care coordination is to help you manage your health and improve access to the health care system. Our team works alongside your provider to assist you in determining your health and social needs. We can help you obtain health care and community resources, providing you with necessary information and education. We can work with you through any barriers and develop a care plan that helps coordinate and strengthen the communication between you and your care team.    Please feel free to contact me regarding care coordination and what we can offer.      We are focused on providing you with the highest-quality healthcare experience possible.    Sincerely,       Yisel MARRUFO  Community Health Worker  [facility] Care Coordination  [facility]  [email or web address]   Office: [phone]

## 2023-01-05 NOTE — PROGRESS NOTES
Clinic Care Coordination Contact  [facility]/Voicemail    Reason for Referral: Financial Support   Financial Support: Insurance     Patient is currently employed full time and reports they are able to function appropriately at work..  at [employer] and works at [employer] store. Difficulties with focus. Pt denies housing or food insecurity. Pt lives alone. He terminated his lease early because he's moving which caused financial stress.     Clinical Data: Care Coordination Outreach  Outreach attempted x 1.  Left message on patient's voicemail with call back information and requested return call.  Plan: CHW will try to reach patient again in 1-2 business days.    Yisel MARRUFO  Community Health Worker  [facility]  Clinic Care Coordination  [facility]  [email or web address]   Office: [phone]

## 2023-01-06 ASSESSMENT — COLUMBIA-SUICIDE SEVERITY RATING SCALE - C-SSRS
TOTAL  NUMBER OF ABORTED OR SELF INTERRUPTED ATTEMPTS LIFETIME: NO
6. HAVE YOU EVER DONE ANYTHING, STARTED TO DO ANYTHING, OR PREPARED TO DO ANYTHING TO END YOUR LIFE?: NO
2. HAVE YOU ACTUALLY HAD ANY THOUGHTS OF KILLING YOURSELF?: NO
TOTAL  NUMBER OF INTERRUPTED ATTEMPTS LIFETIME: NO
1. HAVE YOU WISHED YOU WERE DEAD OR WISHED YOU COULD GO TO SLEEP AND NOT WAKE UP?: NO
ATTEMPT LIFETIME: NO

## 2023-01-06 NOTE — PROGRESS NOTES
Clinic Care Coordination Contact  [facility]/Voicemail    Clinical Data: Care Coordination Outreach  Outreach attempted x 2.  Left message on patient's voicemail with call back information and requested return call.  Plan: CHW will send care coordination introduction letter with care coordinator contact information and explanation of care coordination services via mail. CHW will do no further outreaches at this time.    Yisel MARRUFO  Community Health Worker  [facility] Care Coordination  [facility]  [email or web address]   Office: [phone]

## 2023-01-22 PROBLEM — F84.0 AUTISM DISORDER: Status: ACTIVE | Noted: 2023-01-22

## 2023-01-22 NOTE — ASSESSMENT & PLAN NOTE
History of ADHD diagnosed via formal testing.  Executive functioning impairment (inability to focus, impaired organization and planning, reduced working memory) is impacting his work and reaching goals.  Has a history of using substances which is a risk factor of untreated ADHD.  Motivational interviewing utilized. Currently in the stage of Action/Willpower (Changing behavior), Discussed morbidity and mortality of continued substance abuse.      Will target attention and focus with Vyvanse which is less likely to be misused.  Start Vyvanse 30 mg daily.      Will see what depressive and/or anxiety symptoms are remaining once the ADHD is targeted.     Follow-up in 4 weeks

## 2023-01-31 ENCOUNTER — PATIENT OUTREACH (OUTPATIENT)
Dept: NURSING | Facility: CLINIC | Age: 23
End: 2023-01-31
Payer: COMMERCIAL

## 2023-01-31 SDOH — ECONOMIC STABILITY: TRANSPORTATION INSECURITY
IN THE PAST 12 MONTHS, HAS THE LACK OF TRANSPORTATION KEPT YOU FROM MEDICAL APPOINTMENTS OR FROM GETTING MEDICATIONS?: NO

## 2023-01-31 SDOH — ECONOMIC STABILITY: TRANSPORTATION INSECURITY
IN THE PAST 12 MONTHS, HAS LACK OF TRANSPORTATION KEPT YOU FROM MEETINGS, WORK, OR FROM GETTING THINGS NEEDED FOR DAILY LIVING?: NO

## 2023-01-31 ASSESSMENT — ACTIVITIES OF DAILY LIVING (ADL): DEPENDENT_IADLS:: INDEPENDENT

## 2023-01-31 ASSESSMENT — SOCIAL DETERMINANTS OF HEALTH (SDOH): HOW HARD IS IT FOR YOU TO PAY FOR THE VERY BASICS LIKE FOOD, HOUSING, MEDICAL CARE, AND HEATING?: SOMEWHAT HARD

## 2023-01-31 NOTE — PROGRESS NOTES
Clinic Care Coordination Contact    Clinic Care Coordination Contact  OUTREACH    Referral Information:  Referral Source: Care Team    Primary Diagnosis: Psychosocial    Chief Complaint   Patient presents with     Clinic Care Coordination - Initial        Universal Utilization:   Clinic Utilization  Difficulty keeping appointments: No  Compliance Concerns: No  No-Show Concerns: No  No PCP office visit in Past Year: No    Utilization    Hospital Admissions  0             ED Visits  0             No Show Count (past year)  1                Current as of: 1/31/2023 11:01 AM              Clinical Concerns:  Current Medical Concerns:  n/a      Patient Active Problem List   Diagnosis     Abnormal EKG     Acne     Attention deficit hyperactivity disorder (ADHD)     Deliberate self-cutting     High risk medication use     Overdose of CNS stimulant (H)     Polysubstance dependence including opioid type drug, episodic abuse (H)     Severe episode of recurrent major depressive disorder, with psychotic features (H)     Autism disorder       Current Behavioral Concerns: see recent  notes      Education Provided to patient: role of  CC and clinic care coordination, FRW referral, medication assistance resources      Order: Financial support, insurance.     1/5/23 [facility] VV: Patient is currently employed full time and reports they are able to function appropriately at work.  at [employer] and works for F at [employer]. Difficulties with focus. Pt denies housing or food insecurity. Pt lives alone. He terminated his lease early because he's moving which caused financial stress.     CHW: Patient noted desire to discuss insurance and financial stressors. He is wanting to discuss insurance options and he is on a medication he cannot afford. He has been given [facility] Prescription Assistance # and discussed the possibility of speaking to a FRW. He is in the process of moving and said it's been a financial strain for him. He  terminated his lease early because he's moving which caused financial stress.    Psychiatry VV 1/5/23: History of ADHD diagnosed via formal testing. Executive functioning impairment (inability to focus, impaired organization and planning, reduced working memory) is impacting his work and reaching goals. Has a history of using substances which is a risk factor of untreated ADHD. Motivational interviewing utilized. Currently in the stage of Action/Willpower (Changing behavior), Discussed morbidity and mortality of continued substance abuse.       Will target attention and focus with Vyvanse which is less likely to be misused.  Start Vyvanse 30 mg daily.       Will see what depressive and/or anxiety symptoms are remaining once the ADHD is targeted.      Follow-up in 4 weeks     CONSULTS/REFERRALS:   consider therapy support  Coordinate care with therapist as needed    - [place] PCP 12/1/22 last appt   ------------------------------------------------     CC outreach to pt for initial assessment per CHW scheduling.     Discussed calling [facility] Prescription Assistance team #[phone] to discuss medication affordability concerns. Gave #.     Pt's work has insurance but he does not qualify to access it. He was accessing insurance through his mother's employer but she stopped working at that job. Pt is not clear if he has insurance right now or not. Completed FRW referral.     New address: [address] [place] [address]    Pain  Pain: Not discussed     Health Maintenance Reviewed: Due/Overdue     Health Maintenance Due   Topic Date Due     NICOTINE/TOBACCO CESSATION COUNSELING Q 1 YR  Never done     DEPRESSION ACTION PLAN  Never done     COVID-19 Vaccine (3 - Booster for Pfizer series) 11/08/2021       Clinical Pathway: None    Medication Management:  Medication review status: Medications reviewed and no changes reported per patient.          Functional Status:  Dependent ADLs: Independent  Dependent IADLs: Independent  Bed  or wheelchair confined: No  Mobility Status: Independent  Fallen 2 or more times in the past year?: No  Any fall with injury in the past year?: No    Living Situation:  Current living arrangement: I live alone  Type of residence: Private home - stairs    Lifestyle & Psychosocial Needs:    Social Determinants of Health     Tobacco Use: Medium Risk     Smoking Tobacco Use: Former     Smokeless Tobacco Use: Former     Passive Exposure: Not on file   Alcohol Use: Not on file   Financial Resource Strain: Medium Risk     Difficulty of Paying Living Expenses: Somewhat hard   Food Insecurity: Not on file   Transportation Needs: No Transportation Needs     Lack of Transportation (Medical): No     Lack of Transportation (Non-Medical): No   Physical Activity: Not on file   Stress: Not on file   Social Connections: Not on file   Intimate Partner Violence: Not on file   Depression: At risk     PHQ-2 Score: 4   Housing Stability: Not on file       Diet: Regular  Inadequate nutrition: No  Tube Feeding: No  Inadequate activity/exercise: No  Significant changes in sleep pattern: No  Transportation means: Regular car     Jain or spiritual beliefs that impact treatment: No  Mental health DX: Yes  Mental health DX how managed: Outpatient Counseling, Medication  Mental health management concern: No  Chemical Dependency Status: No Current Concerns  Informal Support system: Family, Friends    Care Coordinator has reviewed patient's Social Determinants of Health (SDoH) on this date. Upon review, changes were made.      Resources and Interventions:  Current Resources:   Community Resources: OP Mental Health  Supplies Currently Used at Home: None  Equipment Currently Used at Home: none  Employment Status: employed full-time    Advance Care Plan/Directive  Advanced Care Plans/Directives on file: No  Advanced Care Plan/Directive Status: Not Applicable    Referrals Placed: Financial Services, Community Resources       Care Plan:  Care  Plan: Financial Wellbeing     Problem: Patient expresses financial resource strain     Goal: Create an action plan to increase financial stability     Start Date: 1/31/2023 Expected End Date: 6/30/2023    Priority: Medium    Note:     Goal Statement: I will apply for health insurance within the next 1-2 weeks if I am eligible.   Strengths: Motivated  Barriers: Access  Patient expressed understanding of goal: Yes    Action steps to achieve this goal:  1. I understand a referral was placed to the Financial Resource Worker, I will receive a call within the next 3 business days.    2. I understand the financial worker will make two attempts to call me. If I still need help with this goal, I will connect with my Community Health Worker Yisel at [phone].                          Patient/Caregiver understanding: Pt reports understanding and denies any additional questions or concerns at this times. JUDITH CC engaged in AIDET communication during encounter.    Outreach Frequency: Monthly    Plan: Patient was provided with this writer's contact information and encouraged to call with any questions or concerns. Pt to call [facility] Prescription Assistance program staff.    JUDITH CC will mail care coordination introduction letter and care plan to patient. SW CC chart review or outreach to pt later this week or next. After that, SW CC will chart review every 4-6 weeks.     CHW to outreach in approx 1 month. Pt needs to schedule psychiatry follow up around 2/2/23. ([phone])    FRW to outreach per their workflows.     ODALIS Stevenson   Social Work Primary Care Clinic Care Coordinator   [facility]  [phone]  [email or web address]

## 2023-01-31 NOTE — LETTER
[facility] CARE COORDINATION  [facility]  [address] [place] [address]    January 31, 2023    Vasquez Rizvi  [address] [facility] [address]      Dear Vasquez,    I am a clinic care coordinator who works with Physician Loren Ref-Primary with the [facility]. I wanted to thank you for spending the time to talk with me.  Below is a description of clinic care coordination and how I can further assist you.       The clinic care coordination team is made up of a registered nurse, , financial resource worker and community health worker who understand the health care system. The goal of clinic care coordination is to help you manage your health and improve access to the health care system. Our team works alongside your provider to assist you in determining your health and social needs. We can help you obtain health care and community resources, providing you with necessary information and education. We can work with you through any barriers and develop a care plan that helps coordinate and strengthen the communication between you and your care team.    Please feel free to contact me with any questions or concerns regarding care coordination and what we can offer.      We are focused on providing you with the highest-quality healthcare experience possible.    Sincerely,     Rachel Saenz [facility]   Social Work Primary Care Clinic Care Coordinator   [facility]  [phone]  [email or web address]     Enclosed: I have enclosed a copy of the Patient Centered Plan of Care. This has helpful information and goals that we have talked about. Please keep this in an easy to access place to use as needed.

## 2023-01-31 NOTE — LETTER
[facility]  Patient Centered Plan of Care  About Me:        Patient Name:  Vasquez Rizvi    YOB: 2000  Age:         22 year old   [place] MRN:    [id number] Telephone Information:  Home Phone [phone]   Mobile [phone]       Address:  [address] [place] [address] Email address:  [email or web address]      Emergency Contact(s)    Name Relationship Lgl Grd Work Phone Home Phone Mobile Phone   1BRUCE DIAZ Mother   [phone]           Primary language:  English     needed? No   [place] [facility]:  [phone] op. 1  Other communication barriers:None  Preferred Method of Communication:     Current living arrangement: I live alone  Mobility Status/ Medical Equipment: Independent    Health Maintenance  Health Maintenance Reviewed: Due/Overdue   Health Maintenance Due   Topic Date Due     NICOTINE/TOBACCO CESSATION COUNSELING Q 1 YR  Never done     DEPRESSION ACTION PLAN  Never done     COVID-19 Vaccine (3 - Booster for Pfizer series) 11/08/2021     My Access Plan  Medical Emergency 911   Primary Clinic Line [facility] - [phone]   24 Hour Appointment Line [phone] or  [phone]) (toll-free)   24 Hour Nurse Line [phone] (toll-free)   Preferred Urgent Care [facility], [phone]     Preferred Hospital [place], [place]  [phone]     Preferred Pharmacy [facility] [place], [place] - [address] [place] [address]     Behavioral Health Crisis Line The National Suicide Prevention Lifeline at 1-534.963.5509 or Text/Call 908     My Care Team Members  Patient Care Team       Relationship Specialty Notifications Start End    No Ref-Primary, Physician PCP - General   11/10/21     Fax: [phone]         Danica Fortune NP Assigned PCP   11/10/22     Phone: [phone] Fax: [phone] [address] [facility]  [place] [address]    Marcie Zamudio APRN CNP Assigned Neuroscience Provider   1/28/23     Phone: [phone] Fax: [phone] [address] [facility] [address]    Rachel Saenz LSW Lead Care Coordinator Primary Care - CC Admissions 1/31/23     Phone: [phone]          [address] [facility] [address]    Yisel Avila Community Health Worker Primary Care - CC Admissions 1/31/23     Phone: [phone]         Shikha Fuentes MA Financial Resource Worker   1/31/23             My Care Plans  Self Management and Treatment Plan  Care Plan  Care Plan: Financial Wellbeing     Problem: Patient expresses financial resource strain     Goal: Create an action plan to increase financial stability     Start Date: 1/31/2023 Expected End Date: 6/30/2023    Priority: Medium    Note:     Goal Statement: I will apply for health insurance within the next 1-2 weeks if I am eligible.   Strengths: Motivated  Barriers: Access  Patient expressed understanding of goal: Yes    Action steps to achieve this goal:  1. I understand a referral was placed to the Financial Resource Worker, I will receive a call within the next 3 business days.    2. I understand the financial worker will make two attempts to call me. If I still need help with this goal, I will connect with my Community Health Worker Yisel at [phone].                           Action Plans on File:                       Advance Care Plans/Directives Type:   No data recorded    [facility]    Advance Care Planning and Health Care Directives  When it comes to decisions about your health care, it s important that your voice is heard. You may not always be able to speak for yourself.    We encourage you to have discussions with your loved ones, cultural or spiritual leaders and health care providers about your goals, values, beliefs and choices.    We are a part of [facility] [employer] [place] , supporting and promoting the benefits of advance care  planning conversations.    Our goals are to:    Help you make informed decisions about your healthcare choices and ensure that those choices are honored    Offer advance care planning discussions with trained staff    Ensure your choices are clearly defined, documented and available in your medical record    Translate your choices into medical orders as needed    Why is Advance Care Planning important?    Know what your health care choices are and decide what is right for you    An unexpected illness or injury could leave you unable to participate in important treatment decisions    When choices are left to others to decide that responsibility can be difficult and stressful    By discussing and outlining your choices, your voice is heard in the care you want to receive    How can I learn more?  We offer free classes at multiple locations, days and times. Our trained facilitators will provide information and guide you through a Health Care Directive document. They can also review, notarize and add your document to your medical record.    Call [employer] at [phone] or toll free at [phone] for assistance.      My Medical and Care Information  Problem List   Patient Active Problem List   Diagnosis     Abnormal EKG     Acne     Attention deficit hyperactivity disorder (ADHD)     Deliberate self-cutting     High risk medication use     Overdose of CNS stimulant (H)     Polysubstance dependence including opioid type drug, episodic abuse (H)     Severe episode of recurrent major depressive disorder, with psychotic features (H)     Autism disorder      Current Medications and Allergies:   Current Outpatient Medications   Medication Instructions     lisdexamfetamine (VYVANSE) 30 mg, Oral, EVERY MORNING     lisdexamfetamine (VYVANSE) 30 mg, Oral, EVERY MORNING     Care Coordination Start Date: 1/31/2023   Frequency of Care Coordination: monthly     Form Last Updated: 01/31/2023

## 2023-01-31 NOTE — PROGRESS NOTES
Clinic Care Coordination Contact  Community Health Worker Initial Outreach    Patient called CHW 1/30/23 to discuss CCC in detail and to schedule an assessment.     Patient accepts CC: Yes. Patient scheduled for assessment with  CC on 1/31 at 9am. Patient noted desire to discuss insurance and financial stressors.     Yisel MARRUFO  Community Health Worker  [facility]  [facility]  [email or web address]   Office: [phone]

## 2023-02-01 ENCOUNTER — PATIENT OUTREACH (OUTPATIENT)
Dept: CARE COORDINATION | Facility: CLINIC | Age: 23
End: 2023-02-01
Payer: COMMERCIAL

## 2023-02-01 NOTE — PROGRESS NOTES
Clinic Care Coordination Contact  Program: [facility]   County:  [facility] Case #:  [facility] Worker:   Alissa #:   Subscriber #:   Renewal:  Date Applied:     [facility] Outreach:   2/1/23 FRW called and patient doesn't qualify for MA at this time and he doesn't have a life changing event due to he still had insurance so we are not able to apply for insurance at this time. FRW did send the patient a sarwat care application to his email and he can send it in when he would like to apply. FRW told patient to reach out the CCC team if he needs any more assistance from the [facility]      Health Insurance:      Referral/Screening:  [facility] Screening    Row Name 01/31/23 0903       [facility] Benefits   Is patient requesting help applying for county benefits? No       Insurance:   [place]? No       Is this an insurance renewal? No       Is this a new insurance application request? Yes       Have you recently applied for insurance? No       How many people in your household?  1       Do you file taxes? Yes       How many dependents do you claim? 0       What is the monthly gross income for the household (wages, social security, workers comp, and pension)?  2000       OTHER   Is this a sarwat care application? Yes       Any other information for the [facility]? [facility] if does not qualify for MNSure or MA; was on mother's insurance but her job ended/no coverage now

## 2023-02-08 NOTE — PROGRESS NOTES
FRW program closed - mailed pt [facility] application. This message was not routed to PC-CC team staff. Saw message today.     CHW to outreach to pt in 1-2 weeks to inquire about other resources or needs.     - pt to call [facility] Prescription Assistance team  - Pt needs to schedule psychiatry follow up around 2/2/23 ([phone])    ODALIS Stevenson   Social Work Primary Care Clinic Care Coordinator   [facility]  [phone]  [email or web address]

## 2023-02-14 ENCOUNTER — TELEPHONE (OUTPATIENT)
Dept: FAMILY MEDICINE | Facility: CLINIC | Age: 23
End: 2023-02-14
Payer: COMMERCIAL

## 2023-02-14 NOTE — TELEPHONE ENCOUNTER
I spoke with Vasquez, he is in need of financial assistance for medication.    We reviewed the Pharmacy Assistance Fund $500, the Prescription Assistance Program for manfacturer assistance programs, gross income, insurance and Rx list.    The [employer] assistance application for Vyvanse will be completed.    When approved, Vasquez will receive this medication at no cost for 1 year.    Neli Boles  Prescription Assistance Supervisor  Pharmacy Assistance  [phone]

## 2023-02-16 ENCOUNTER — PATIENT OUTREACH (OUTPATIENT)
Dept: CARE COORDINATION | Facility: CLINIC | Age: 23
End: 2023-02-16
Payer: COMMERCIAL

## 2023-02-16 ENCOUNTER — TELEPHONE (OUTPATIENT)
Dept: PSYCHIATRY | Facility: CLINIC | Age: 23
End: 2023-02-16
Payer: COMMERCIAL

## 2023-02-16 DIAGNOSIS — F90.2 ATTENTION DEFICIT HYPERACTIVITY DISORDER (ADHD), COMBINED TYPE: ICD-10-CM

## 2023-02-16 NOTE — TELEPHONE ENCOUNTER
Reason for call:  Medication     If this is a refill request, has the caller requested the refill from the pharmacy already? Yes     Will the patient be using a [facility]? Yes     Name of the pharmacy and phone number for the current request:   [facility], [place] - [address] [place] [address]  [phone]    Name of the medication requested: Vyvanse    Other request: pt requests that their first order of Vyvanse be transferred to [facility]. Declined to schedule follow-up when asked because they do not have insurance. Please call if you are unable to transfer.     Phone number to reach patient:  Home number on file [phone] (home)    Best Time:  ASAP    Can we leave a detailed message on this number?  YES    Travel screening: Not Applicable

## 2023-02-16 NOTE — PROGRESS NOTES
Clinic Care Coordination Contact  Community Health Worker Follow Up    Care Gaps:     Health Maintenance Due   Topic Date Due     NICOTINE/TOBACCO CESSATION COUNSELING Q 1 YR  Never done     DEPRESSION ACTION PLAN  Never done     COVID-19 Vaccine (3 - Booster for Pfizer series) 11/08/2021     Declined due to no insurance coverage at this time     Care Plan:   Care Plan: Financial Wellbeing     Problem: Patient expresses financial resource strain     Goal: Create an action plan to increase financial stability     Start Date: 1/31/2023 Expected End Date: 6/30/2023    This Visit's Progress: 80%    Priority: Medium    Note:     Goal Statement: I will apply for health insurance within the next 1-2 weeks if I am eligible.   Strengths: Motivated  Barriers: Access  Patient expressed understanding of goal: Yes    Action steps to achieve this goal:  1. I understand a referral was placed to the Financial Resource Worker, I will receive a call within the next 3 business days.    2. I understand the financial worker will make two attempts to call me. If I still need help with this goal, I will connect with my Community Health Worker Yisel at [phone].    3. I will complete and submit [facility] form to help with outstanding [facility] bill (Completed)                    Intervention and Education during outreach: FRW note indicates that patient doesn't qualify for MA - [facility] Care if does not qualify for MNSure or MA;     Patient stated he has filled out and submitted the [facility] Radha Care form and is now waiting for a response.    Pt needs to schedule psychiatry follow up around 2/2/23 ([phone]) - Declined to schedule follow-up when asked because he does not have insurance.     CHW and patient discussed the following chart note from Alba Pappas RN  - Patient aware.   Per chart, patient does not have insurance. As of today, patient has been approved for $500 of the Pharmacy Assistance Fund to be filled at the [facility]. Per [facility], cost of 30 capsules of Vyvanse is approx. $394.00. Routing request to resend Vyvanse rx to [facility] pharmacy to Marcie Zamudio.     CHW Plan:     will route encounter to [facility] to review and discuss patient's future options for medications and health coverage.     will offer [facility] Prescription Assistance # to patient and ask that he call their team to discuss assistance for future med costs.     Yisel MARRUFO  Community Health Worker  [facility] Care Coordination  [facility]  [email or web address]   Office: [phone]

## 2023-02-16 NOTE — TELEPHONE ENCOUNTER
Per chart, lisdexamfetamine (Vyvanse) rx were sent by Marcie Zamudio 1/5/23 to [facility] in [place]. Per pharmacy, rx not filled. Rx cancelled at [facility].     Per chart, patient does not have insurance. Per chart, as of today, patient has been approved for $500 of the Pharmacy Assistance Fund to be filled at the [facility]. Per [facility], cost of 30 capsules of Vyvanse is approx. $394.00. Routing request to resend Vyvanse rx to [facility] pharmacy to Marcie Zamudio.     Lineagent message sent informing patient and providing list of [facility] with income-based care/sliding fee scale that offer mental health services (in addition to primary care health services) for patients without insurance. See below.    [facility] ([facility])  [address] [place] [address]  [phone] main desk    [facility] ([facility])  [email or web address]     Locations:   [facility]   [address] [place] [address]   [phone]       [facility]   [address] [place] [address]   [phone]     [facility]   [address], [place] [address]   [phone]     [facility]  [address] [place] [address]  [phone] main desk  Monday-Friday 9a-5pm    [facility]  Several locations throughout [facility]  [phone]    Alba Pappas RN on 2/16/2023 at 12:33 PM

## 2023-02-17 RX ORDER — LISDEXAMFETAMINE DIMESYLATE 30 MG/1
30 CAPSULE ORAL EVERY MORNING
Qty: 30 CAPSULE | Refills: 0 | Status: SHIPPED | OUTPATIENT
Start: 2023-02-17

## 2023-02-20 NOTE — PROGRESS NOTES
Clinic Care Coordination Contact  Financial Resource Worker Screening    [facility] Benefits:  Is patient requesting help applying for [facility] benefits?: No    [place]:  [place] No  Is this an insurance renewal?: No  Is this a new insurance application request?: Yes  Have you recently applied for insurance?: No  How many people in your household? : 1  Do you file taxes?: Yes  How many dependents do you claim?: 0  What is the monthly gross income for the household (wages, social security, workers comp, and pension)? : 2000    Any other information for the W?: Radha Care if does not qualify for M[place]ure or [place]; was on mother's insurance but her job ended and has no coverage now    ODALIS Dinero   Social Work Primary Care Clinic Care Coordinator   [facility]  [phone]  [email or web address]

## 2023-02-23 ENCOUNTER — PATIENT OUTREACH (OUTPATIENT)
Dept: CARE COORDINATION | Facility: CLINIC | Age: 23
End: 2023-02-23
Payer: COMMERCIAL

## 2023-02-23 NOTE — PROGRESS NOTES
Clinic Care Coordination Contact  Program: Alissa   County:  [facility] Case #:  [facility] Worker:   Alissa #:   Subscriber #:   Renewal:  Date Applied:     FRW Outreach:   2/23/23  FRW called patient and left a vm with call back information. FRW will make outreach next week      Health Insurance:      Referral/Screening:  FRW Screening    Row Name 02/20/23 0748       [facility] Benefits   Is patient requesting help applying for [facility] benefits? No       [place]:   [place]? No       Is this an insurance renewal? No       Is this a new insurance application request? Yes       Have you recently applied for insurance? No       How many people in your household?  1       Do you file taxes? Yes       How many dependents do you claim? 0       What is the monthly gross income for the household (wages, social security, workers comp, and pension)?  2000       OTHER   Is this a sarwat care application? Yes       Any other information for the FRW? Sarwat Care if does not qualify for M[place]ure or [place]; was on mother's insurance but her job ended and has no coverage now

## 2023-02-28 ENCOUNTER — PATIENT OUTREACH (OUTPATIENT)
Dept: CARE COORDINATION | Facility: CLINIC | Age: 23
End: 2023-02-28
Payer: COMMERCIAL

## 2023-02-28 NOTE — PROGRESS NOTES
Clinic Care Coordination Contact  Program: Alissa   County:  [facility] Case #:  [facility] Worker:   [place]issa #:   Subscriber #:   Renewal:  Date Applied:     FRW Outreach:   2/28/2023 FRW called patient and left a final vm with call back information as attempt x2 with no answer or return phone calls. FRW closed the FRW program and remove patient from panel. Patient can be referred back to FRW if needed.      2/23/23  FRW called patient and left a vm with call back information. FRW will make outreach next week      Health Insurance:      Referral/Screening:  [facility] Screening    Row Name 02/20/23 0748       [facility] Benefits   Is patient requesting help applying for [facility] benefits? No       Insurance:   [place]? No       Is this an insurance renewal? No       Is this a new insurance application request? Yes       Have you recently applied for insurance? No       How many people in your household?  1       Do you file taxes? Yes       How many dependents do you claim? 0       What is the monthly gross income for the household (wages, social security, workers comp, and pension)?  2000       OTHER   Is this a sarwat care application? Yes       Any other information for the FRW? Sarwat Care if does not qualify for MNSure or MA; was on mother's insurance but her job ended and has no coverage now

## 2023-03-20 ENCOUNTER — PATIENT OUTREACH (OUTPATIENT)
Dept: CARE COORDINATION | Facility: CLINIC | Age: 23
End: 2023-03-20
Payer: COMMERCIAL

## 2023-03-20 NOTE — PROGRESS NOTES
Clinic Care Coordination Contact  Care Coordination Clinician Chart Review    Situation: Patient chart reviewed by Care Coordinator.       Background: Care Coordination Program started: 1/31/2023. Initial assessment completed 1/31/23 and patient-centered care plan(s) were developed with participation from patient. Lead CC handed patient off to CHW for continued outreaches.       Assessment: Per chart review, patient outreach completed by CC CHW on 2/16/23. Patient is actively working to accomplish goal(s). Patient's goal(s) appropriate and relevant at this time.     Patient is not due for updated Plan of Care.      Assessments will be completed annually or as needed/with change of patient status.        Care Plan: Financial Wellbeing     Problem: Patient expresses financial resource strain     Goal: Create an action plan to increase financial stability     Start Date: 1/31/2023 Expected End Date: 6/30/2023    This Visit's Progress: 80%    Priority: Medium    Note:     Goal Statement: I will apply for health insurance within the next 1-2 weeks if I am eligible.   Strengths: Motivated  Barriers: Access  Patient expressed understanding of goal: Yes    Action steps to achieve this goal:  1. I understand a referral was placed to the Financial Resource Worker, I will receive a call within the next 3 business days.    2. I understand the financial worker will make two attempts to call me. If I still need help with this goal, I will connect with my Community Health Worker Yisel at [phone].    3. I will complete and submit Radha Care form to help with outstanding [facility] bill (Completed)                           Plan/Recommendations: The patient will continue working with Care Coordination to achieve goal(s) as above.     CHW will continue outreaches at minimum every 30 days and will involve Lead CC as needed or if patient is ready to move to Maintenance.     - Psychiatry follow up in 4 weeks (around 2/5/23) not  scheduled  - pt was approved for $500 prescription assistance at [facility] and other application completed (see notes 2/14/23 and 2/16/23)  - FRW outreach x2, UTC and closed - will need to send new referral (see 2/28/23 note); pt may not have insurance  - psychiatry team sent sliding scale clinics to pt (see 2/16/23 note)    Lead CC will continue to monitor CHW outreaches and patient's progress to goal(s) every 6 weeks.     Plan of Care updated and sent to patient: No    ODALIS Dinero   Social Work Primary Care Clinic Care Coordinator   [facility]  [phone]  [email or web address]

## 2023-03-27 ENCOUNTER — PATIENT OUTREACH (OUTPATIENT)
Dept: CARE COORDINATION | Facility: CLINIC | Age: 23
End: 2023-03-27
Payer: COMMERCIAL

## 2023-03-27 NOTE — PROGRESS NOTES
Clinic Care Coordination Contact  Community Health Worker Follow Up    Care Gaps:     Health Maintenance Due   Topic Date Due     NICOTINE/TOBACCO CESSATION COUNSELING Q 1 YR  Never done     DEPRESSION ACTION PLAN  Never done     COVID-19 Vaccine (3 - Booster for Pfizer series) 11/08/2021     Postponed to next outreach     Care Plan:   Care Plan: Financial Wellbeing     Problem: Patient expresses financial resource strain     Goal: Create an action plan to increase financial stability  Completed 3/27/2023    Start Date: 1/31/2023 Expected End Date: 6/30/2023    This Visit's Progress: 100% Recent Progress: 80%    Priority: Medium    Note:     Goal Statement: I will apply for health insurance within the next 1-2 weeks if I am eligible.   Strengths: Motivated  Barriers: Access  Patient expressed understanding of goal: Yes    Action steps to achieve this goal:  1. I understand a referral was placed to the Financial Resource Worker, I will receive a call within the next 3 business days.    2. I understand the financial worker will make two attempts to call me. If I still need help with this goal, I will connect with my Community Health Worker Yisel at [phone].    3. I will complete and submit Radha Care form to help with outstanding [facility] bill (Completed)                    Intervention and Education during outreach: Patient stated he now has M[place]are and will schedule a psychiatry follow up appt soon.    Per  CC note 3/20, psychiatry team sent sliding scale clinics to pt (see 2/16/23 note)    Patient has completed all goals with Clinic Care Coordination.     CHW Plan:     will route encounter to  CC to review the chart and confirm if maintenance is approved    will outreach in 2 months to discuss new needs, if any and also discuss patient establishing PCP (assist w/making appt, if needed)    Yisel MARRUFO  Community Health Worker  [facility]  Clinic Care Coordination  [facility]  [email or web address]   Office: [phone]

## 2023-03-27 NOTE — PROGRESS NOTES
Clinic Care Coordination Contact    Patient has completed all goals with Clinic Care Coordination. Maintenance is approved.    Rachel Bartlett [facility] [place]   Social Work Primary Care Clinic Care Coordinator   [facility]  [phone]  [email or web address]

## 2023-05-31 ENCOUNTER — PATIENT OUTREACH (OUTPATIENT)
Dept: CARE COORDINATION | Facility: CLINIC | Age: 23
End: 2023-05-31
Payer: COMMERCIAL

## 2023-05-31 NOTE — PROGRESS NOTES
Clinic Care Coordination Contact  [facility]/Voicemail    Clinical Data: Care Coordination Outreach  Outreach attempted x 1.  Left message on patient's voicemail with call back information and requested return call.  Plan: CHW will try to reach patient again in 5-7 business days for maintenance check in.    Yisel MARRUFO  Community Health Worker  [facility] Care Coordination  [facility]  [email or web address]   Office: [phone]

## 2023-05-31 NOTE — LETTER
[facility] CARE COORDINATION  [facility]  [address] [place] [address]    June 19, 2023    Vasquez Rizvi  [address]   [facility] [address]    Dear Vasquez,    Your Care Team congratulates you on your journey to maintain wellness. This document will help guide you on your journey to maintain a healthy lifestyle.  You can use this to help you overcome any barriers you may encounter.  If you should have any questions or concerns, you can contact the members of your Care Team or contact your Primary Care Clinic for assistance.     Health Maintenance  Health Maintenance Reviewed:    Health Maintenance Due   Topic Date Due    NICOTINE/TOBACCO CESSATION COUNSELING Q 1 YR  Never done    DEPRESSION ACTION PLAN  Never done    COVID-19 Vaccine (3 - Pfizer series) 11/08/2021    PHQ-9  06/01/2023     My Access Plan  Medical Emergency 911   Primary Clinic Line  -    24 Hour Appointment Line [phone] or  [phone]) (toll-free)   24 Hour Nurse Line [phone] (toll-free)   Preferred Urgent Care     Preferred Hospital     Preferred Pharmacy [facility] [place], [place] - [address] [place] [address]     Behavioral Health Crisis Line The National Suicide Prevention Lifeline at 1-654.364.3180 or 911     My Care Team Members  Patient Care Team         Relationship Specialty Notifications Start End    No Ref-Primary, Physician PCP - General   11/10/21     Fax: [phone]         Danica Fortune, NP Assigned PCP   11/10/22     Phone: [phone] Fax: [phone]         [address] [facility] [address]    Marcie Zamudio DNP Assigned Neuroscience Provider   1/28/23     Phone: [phone] Fax: [phone] [address] [facility] [address]                 Goals    None              It has been your Clinic Care Team's pleasure to work with you on your goals.    Regards,    Your Clinic Care Team

## 2023-06-08 NOTE — PROGRESS NOTES
Clinic Care Coordination Contact  [facility]/Voicemail    Clinical Data: Care Coordination Outreach  Outreach attempted x 2.  Left message on patient's voicemail with call back information and requested return call.  Plan: CHW will try to reach patient again in 5-10 business days for maintenance check in.      CHW will inquire if patient would like to schedule an appt to establish care with new PCP.     Yisel MARRUFO  Community Health Worker  [facility] Care Coordination  [facility]  [email or web address]   Office: [phone]

## 2023-06-16 NOTE — PROGRESS NOTES
Clinic Care Coordination Contact  Community Health Worker Follow Up    Care Gaps:     Health Maintenance Due   Topic Date Due     NICOTINE/TOBACCO CESSATION COUNSELING Q 1 YR  Never done     DEPRESSION ACTION PLAN  Never done     COVID-19 Vaccine (3 - Pfizer series) 11/08/2021     PHQ-9  06/01/2023     Patient will schedule independently.     Care Plan:   Care Plan: Financial Wellbeing     Problem: Patient expresses financial resource strain     Goal: Create an action plan to increase financial stability  Completed 3/27/2023    Start Date: 1/31/2023 Expected End Date: 6/30/2023    This Visit's Progress: 100% Recent Progress: 80%    Priority: Medium    Note:     Goal Statement: I will apply for health insurance within the next 1-2 weeks if I am eligible.   Strengths: Motivated  Barriers: Access  Patient expressed understanding of goal: Yes    Action steps to achieve this goal:  1. I understand a referral was placed to the Financial Resource Worker, I will receive a call within the next 3 business days.    2. I understand the financial worker will make two attempts to call me. If I still need help with this goal, I will connect with my Community Health Worker Yisel at [phone].    3. I will complete and submit Radha Care form to help with outstanding [facility] bill (Completed)                    Intervention and Education during outreach: Patient stated that he would be interested in making an appt to establish care with PCP.  Patient will call clinic to schedule appt independently.     Patient disconnected call, CHW called back to discuss moving to graduation status, received VM.       CHW left message on patient's voicemail with call back information and requested return call.    Patient has completed all goals with Clinic Care Coordination.     CHW Plan:     will route to [facility] to review the chart and confirm if graduation is approved.    will do no further outreaches at this time.    Yisel MARRUFO  [facility]  Yisel CLARK [facility]  Clinic Care Coordination  [facility]  [email or web address]   Office: [phone]

## 2023-06-19 NOTE — PROGRESS NOTES
Clinic Care Coordination Contact    Assessment: CHW Care Coordinator contacted patient for 2 month follow up. Patient has continued to follow the plan of care and assessment is negative for any new needs or concerns.    Enrollment status: Graduated     Plan: No further outreaches at this time. Patient will continue to follow the plan of care. If new needs arise a new Care Coordination referral may be placed. Letter sent.     Rachel Bartlett KOLBY   Social Work Primary Care Clinic Care Coordinator   [facility]  [phone]  [email or web address]

## 2023-09-14 ENCOUNTER — MYC REFILL (OUTPATIENT)
Dept: PSYCHIATRY | Facility: CLINIC | Age: 23
End: 2023-09-14
Payer: COMMERCIAL

## 2023-09-14 DIAGNOSIS — F90.2 ATTENTION DEFICIT HYPERACTIVITY DISORDER (ADHD), COMBINED TYPE: ICD-10-CM

## 2023-09-15 NOTE — TELEPHONE ENCOUNTER
RN to diana. Pt is overdue for follow up    Date of Last Office Visit: 1/5/2023 - intake with TABATHA Zamudio     Last visit treatment plan:     Return in about 4 weeks (around 2/2/2023) for Follow up with me.     Last Rx'd on 2/17/23 Qty: 30 Rx: 0      lisdexamfetamine (VYVANSE) 30 MG capsule 30 capsule 0 2/17/2023  No   Sig - Route: Take 1 capsule (30 mg) by mouth every morning - Oral   Sent to pharmacy as: Lisdexamfetamine Dimesylate 30 MG Oral Capsule (Vyvanse)   Class: E-Prescribe   Earliest Fill Date: 2/17/2023

## 2023-09-18 RX ORDER — LISDEXAMFETAMINE DIMESYLATE 30 MG/1
30 CAPSULE ORAL EVERY MORNING
Qty: 30 CAPSULE | Refills: 0 | OUTPATIENT
Start: 2023-09-18

## 2023-09-18 NOTE — TELEPHONE ENCOUNTER
Patient is no longer under providers care. Refusing Vyvanse prescription.     Olinda Luke RN on 9/18/2023 at 3:42 PM

## 2023-10-18 ENCOUNTER — MYC MEDICAL ADVICE (OUTPATIENT)
Dept: FAMILY MEDICINE | Facility: CLINIC | Age: 23
End: 2023-10-18
Payer: COMMERCIAL

## 2023-11-03 ENCOUNTER — OFFICE VISIT (OUTPATIENT)
Dept: FAMILY MEDICINE | Facility: CLINIC | Age: 23
End: 2023-11-03
Payer: COMMERCIAL

## 2023-11-03 VITALS
OXYGEN SATURATION: 98 % | DIASTOLIC BLOOD PRESSURE: 64 MMHG | SYSTOLIC BLOOD PRESSURE: 120 MMHG | HEIGHT: 73 IN | RESPIRATION RATE: 16 BRPM | WEIGHT: 173 LBS | TEMPERATURE: 97.2 F | BODY MASS INDEX: 22.93 KG/M2 | HEART RATE: 79 BPM

## 2023-11-03 DIAGNOSIS — M25.842 CYST OF JOINT OF LEFT HAND: Primary | ICD-10-CM

## 2023-11-03 PROCEDURE — 99213 OFFICE O/P EST LOW 20 MIN: CPT | Performed by: NURSE PRACTITIONER

## 2023-11-03 ASSESSMENT — PAIN SCALES - GENERAL: PAINLEVEL: NO PAIN (0)

## 2023-11-03 ASSESSMENT — PATIENT HEALTH QUESTIONNAIRE - PHQ9
SUM OF ALL RESPONSES TO PHQ QUESTIONS 1-9: 8
10. IF YOU CHECKED OFF ANY PROBLEMS, HOW DIFFICULT HAVE THESE PROBLEMS MADE IT FOR YOU TO DO YOUR WORK, TAKE CARE OF THINGS AT HOME, OR GET ALONG WITH OTHER PEOPLE: SOMEWHAT DIFFICULT
SUM OF ALL RESPONSES TO PHQ QUESTIONS 1-9: 8

## 2023-11-03 NOTE — PROGRESS NOTES
"  Assessment & Plan     Cyst of joint of left hand  Appears to be a soft tissue cyst, however cannot entirely exclude that this is within the tendon.  As it is irritating to him, he would like to discuss having this removed.  He was referred to orthopedics for further evaluation.  - Orthopedic  Referral; Future       See Patient Instructions    LACEY Keys CNP  [facility] AURELIO Ovalle is a 22 year old, presenting for the following health issues:  Hand Problem (Bump on left hand between thumb and index finger in webbing of hand )        11/3/2023     7:14 AM   Additional Questions   Roomed by Leah GAVIN   Accompanied by self       History of Present Illness       Reason for visit:  Lump on my hand, probably cyst?  Symptom onset:  1-3 days ago  Symptom intensity:  Mild  Symptom progression:  Staying the same  Had these symptoms before:  No    He eats 0-1 servings of fruits and vegetables daily.He consumes 1 sweetened beverage(s) daily.He exercises with enough effort to increase his heart rate 9 or less minutes per day.  He exercises with enough effort to increase his heart rate 3 or less days per week.   He is taking medications regularly.     Above HPI reviewed. Additionally, noticed a cystlike lesion at the base of his left thumb approximately a week ago.  It is not necessarily painful but somewhat irritating.  It has remained the same size since he first noticed it.  He denies any repetitive motion, trauma to the area.  He is right-hand dominant.         Review of Systems   Constitutional, HEENT, cardiovascular, pulmonary, gi and gu systems are negative, except as otherwise noted.      Objective    /64   Pulse 79   Temp 97.2  F (36.2  C) (Tympanic)   Resp 16   Ht 1.854 m (6' 1\")   Wt 78.5 kg (173 lb)   SpO2 98%   BMI 22.82 kg/m    Body mass index is 22.82 kg/m .  Physical Exam  Vitals and nursing note reviewed.   Constitutional:       Appearance: Normal " appearance.   HENT:      Head: Normocephalic and atraumatic.      Mouth/Throat:      Mouth: Mucous membranes are moist.   Cardiovascular:      Rate and Rhythm: Normal rate.   Pulmonary:      Effort: Pulmonary effort is normal.   Musculoskeletal:      Cervical back: Neck supple.      Comments: Left hand-no skin changes.  There is an approximate 5 cm cystic-like lesion adjacent to the first MCP J without overlying erythema or fluctuance.  It is mobile, nontender to palpation.  Normal range of motion of the thumb.  CMS is intact.   Skin:     General: Skin is warm and dry.   Neurological:      General: No focal deficit present.      Mental Status: He is alert.   Psychiatric:         Mood and Affect: Mood normal.         Behavior: Behavior normal.

## 2023-11-20 ENCOUNTER — OFFICE VISIT (OUTPATIENT)
Dept: ORTHOPEDICS | Facility: CLINIC | Age: 23
End: 2023-11-20
Payer: COMMERCIAL

## 2023-11-20 ENCOUNTER — ANCILLARY PROCEDURE (OUTPATIENT)
Dept: GENERAL RADIOLOGY | Facility: CLINIC | Age: 23
End: 2023-11-20
Attending: FAMILY MEDICINE
Payer: COMMERCIAL

## 2023-11-20 VITALS — HEIGHT: 73 IN | BODY MASS INDEX: 22.82 KG/M2

## 2023-11-20 DIAGNOSIS — M25.842 CYST OF JOINT OF LEFT HAND: ICD-10-CM

## 2023-11-20 DIAGNOSIS — M25.832 MASS OF JOINT OF LEFT WRIST: Primary | ICD-10-CM

## 2023-11-20 DIAGNOSIS — M79.642 LEFT HAND PAIN: ICD-10-CM

## 2023-11-20 PROCEDURE — 99204 OFFICE O/P NEW MOD 45 MIN: CPT | Performed by: FAMILY MEDICINE

## 2023-11-20 PROCEDURE — 73130 X-RAY EXAM OF HAND: CPT | Mod: TC | Performed by: RADIOLOGY

## 2023-11-20 NOTE — LETTER
11/20/2023         RE: Vasquez Rizvi  [address]  [facility] [address]        Dear Colleague,    Thank you for referring your patient, Vasquez Rizvi, to the [facility]. Please see a copy of my visit note below.    ASSESSMENT & PLAN    Vasquez was seen today for pain.    Diagnoses and all orders for this visit:    Mass of joint of left wrist  -     CTA Upper Extremity Left Runoff with Contrast; Future    Cyst of joint of left hand  -     Orthopedic  Referral    Left hand pain  -     XR Hand Left G/E 3 Views; Future      This issue is acute and Unchanged.    # Left Wrist Mass: Vasquez Rizvi  was seen today for left wrist mass. Symptoms had been going on for 3 weeks without inciting injury. On examination there are positive findings of swelling over the anatomical snuffbox of the left wrist. Imaging findings showed no arthritis no fracture on left wrist x-rays, ultrasound showing hypoechoic mass adjacent to the radial artery in the anatomical snuffbox 1.5 x 1.8 x 2.0 cm. Likely cause of patient's condition due to ganglion cyst. 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  Counseled patient on nature of condition and treatment options.  Given this plan as below, follow-up 2 weeks for repeat evaluation after left upper extremity wrist CTA     Image Findings: no wrist fracture,   Treatment: Activities as tolerated, left wrist CTA ordered  Job: As tolerated  Medications/Injections: Limited tylenol/ibuprofen for pain for 1-2 weeks, Topical Voltaren gel, none  Follow-up: In person after CT to go over the results and consideration of aspiration if cyst      Mick Jameson MD  [facility]    -----  Chief Complaint   Patient presents with     Left Hand - Pain       SUBJECTIVE  Vasquez Rizvi is a/an 22 year old male who is seen in consultation at the request of  Harleen Pride   "C.N.P. for evaluation of left hand pain. Reviewed PCP notes.    The patient is seen by themselves.  The patient is Right handed    Onset: 4-6 week(s) ago. Reports insidious onset without acute precipitating event. Saw PCP 11/3/23.   Location of Pain: left hand  Worsened by: increased use   Better with: rest  Treatments tried: rest/activity avoidance  Associated symptoms: swelling, numbness, tingling     Orthopedic/Surgical history: NO  Social History/Occupation: [employer]     No family history pertinent to patient's problem today.      REVIEW OF SYSTEMS:  Review of Systems  Constitutional, HEENT, cardiovascular, pulmonary, gi and gu systems are negative, except as otherwise noted.    OBJECTIVE:  Ht 1.854 m (6' 1\")   BMI 22.82 kg/m     General: healthy, alert and in no distress  HEENT: no scleral icterus or conjunctival erythema  Skin: no suspicious lesions or rash. No jaundice.  CV: distal perfusion intact    Resp: normal respiratory effort without conversational dyspnea   Psych: normal mood and affect  Gait: normal steady gait with appropriate coordination and balance    Neuro: Normal light sensory exam of left upper extremity     Ortho Exam   LEFT HAND  Inspection:   Swelling over 1st webspace  Palpation:   Carpals: normal   Metacarpals: normal   Thumb: palpable swelling over 1st webspace   Fingers: normal  Range of Motion:    Full active flexion and extension at MCP, PIP, and DIP joints; normal finger cascade without malrotation.  Wrist pronation, supination, and ulnar/radial deviation normal.  Strength:     full, extension full, flexion full, abduction full, adduction full, opposition full  Special Tests:    Positive: none    Negative: flexor digitorum superficialis testing, flexor digitorum profundus testing    RADIOLOGY:  I independently ordered, visualized and reviewed these images with the patient  No fracture, no arthritis      Review of external notes as documented elsewhere in note  Review of the " result(s) of each unique test - left hand x-rays       Disclaimer: This note consists of symbols derived from keyboarding, dictation and/or voice recognition software. As a result, there may be errors in the script that have gone undetected. Please consider this when interpreting information found in this chart.    Ultrasound Findings: Hypoechoic mass adjacent to the radial artery in the anatomical snuffbox measuring 1.2 cm x 1 cm x 8.8 cm no blood flow    Please do not bill the ultrasound scan.  Please bill E/M and ultrasound guided injection if given.      Again, thank you for allowing me to participate in the care of your patient.        Sincerely,        Mick Jameson MD

## 2023-11-20 NOTE — PATIENT INSTRUCTIONS
# Left Wrist Mass: Vasquez Rizvi  was seen today for left wrist mass. Symptoms had been going on for 3 weeks without inciting injury. On examination there are positive findings of swelling over the anatomical snuffbox of the left wrist. Imaging findings showed no arthritis no fracture on left wrist x-rays, ultrasound showing hypoechoic mass adjacent to the radial artery in the anatomical snuffbox 1.5 x 1.8 x 2.0 cm. Likely cause of patient's condition due to ganglion cyst. 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  Counseled patient on nature of condition and treatment options.  Given this plan as below, follow-up 2 weeks for repeat evaluation after left upper extremity wrist CTA     Image Findings: no wrist fracture,   Treatment: Activities as tolerated, left wrist CTA ordered  Job: As tolerated  Medications/Injections: Limited tylenol/ibuprofen for pain for 1-2 weeks, Topical Voltaren gel, none  Follow-up: In person after CT to go over the results and consideration of aspiration if cyst    Please call [phone]   Ask for my team if you have any questions or concerns    If you have not yet received the influenza vaccine but would like to get one, please call  [phone] or you can schedule via [employer]    It was great seeing you today!    Mick Jameson MD, [facility]     [place] Scheduling Appointments  [phone] [place]   [phone] [place]

## 2023-11-20 NOTE — PROGRESS NOTES
ASSESSMENT & PLAN    Vasquez was seen today for pain.    Diagnoses and all orders for this visit:    Mass of joint of left wrist  -     CTA Upper Extremity Left Runoff with Contrast; Future    Cyst of joint of left hand  -     Orthopedic  Referral    Left hand pain  -     XR Hand Left G/E 3 Views; Future      This issue is acute and Unchanged.    # Left Wrist Mass: Vasquez Rizvi  was seen today for left wrist mass. Symptoms had been going on for 3 weeks without inciting injury. On examination there are positive findings of swelling over the anatomical snuffbox of the left wrist. Imaging findings showed no arthritis no fracture on left wrist x-rays, ultrasound showing hypoechoic mass adjacent to the radial artery in the anatomical snuffbox 1.5 x 1.8 x 2.0 cm. Likely cause of patient's condition due to ganglion cyst. 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  Counseled patient on nature of condition and treatment options.  Given this plan as below, follow-up 2 weeks for repeat evaluation after left upper extremity wrist CTA     Image Findings: no wrist fracture,   Treatment: Activities as tolerated, left wrist CTA ordered  Job: As tolerated  Medications/Injections: Limited tylenol/ibuprofen for pain for 1-2 weeks, Topical Voltaren gel, none  Follow-up: In person after CT to go over the results and consideration of aspiration if cyst      Mick Jameson MD  [facility]    -----  Chief Complaint   Patient presents with    Left Hand - Pain       SUBJECTIVE  Vasquez Rizvi is a/an 22 year old male who is seen in consultation at the request of  Harleen Pride C.N.P. for evaluation of left hand pain. Reviewed PCP notes.    The patient is seen by themselves.  The patient is Right handed    Onset: 4-6 week(s) ago. Reports insidious onset without acute precipitating event. Saw PCP 11/3/23.   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      REVIEW OF SYSTEMS:  Review of Systems  Constitutional, HEENT, cardiovascular, pulmonary, gi and gu systems are negative, except as otherwise noted.    OBJECTIVE:  Ht 1.854 m (6' 1\")   BMI 22.82 kg/m     General: healthy, alert and in no distress  HEENT: no scleral icterus or conjunctival erythema  Skin: no suspicious lesions or rash. No jaundice.  CV: distal perfusion intact    Resp: normal respiratory effort without conversational dyspnea   Psych: normal mood and affect  Gait: normal steady gait with appropriate coordination and balance    Neuro: Normal light sensory exam of left upper extremity     Ortho Exam   LEFT HAND  Inspection:   Swelling over 1st webspace  Palpation:   Carpals: normal   Metacarpals: normal   Thumb: palpable swelling over 1st webspace   Fingers: normal  Range of Motion:    Full active flexion and extension at MCP, PIP, and DIP joints; normal finger cascade without malrotation.  Wrist pronation, supination, and ulnar/radial deviation normal.  Strength:     full, extension full, flexion full, abduction full, adduction full, opposition full  Special Tests:    Positive: none    Negative: flexor digitorum superficialis testing, flexor digitorum profundus testing    RADIOLOGY:  I independently ordered, visualized and reviewed these images with the patient  No fracture, no arthritis      Review of external notes as documented elsewhere in note  Review of the result(s) of each unique test - left hand x-rays       Disclaimer: This note consists of symbols derived from keyboarding, dictation and/or voice recognition software. As a result, there may be errors in the script that have gone undetected. Please " consider this when interpreting information found in this chart.    Ultrasound Findings: Hypoechoic mass adjacent to the radial artery in the anatomical snuffbox measuring 1.2 cm x 1 cm x 8.8 cm no blood flow    Please do not bill the ultrasound scan.  Please bill E/M and ultrasound guided injection if given.

## 2023-11-22 ENCOUNTER — HOSPITAL ENCOUNTER (OUTPATIENT)
Dept: CT IMAGING | Facility: CLINIC | Age: 23
Discharge: HOME | End: 2023-11-22
Attending: FAMILY MEDICINE | Admitting: FAMILY MEDICINE
Payer: COMMERCIAL

## 2023-11-22 DIAGNOSIS — M25.832 MASS OF JOINT OF LEFT WRIST: ICD-10-CM

## 2023-11-22 PROCEDURE — 250N000011 HC RX IP 250 OP 636: Performed by: RADIOLOGY

## 2023-11-22 PROCEDURE — 250N000009 HC RX 250: Performed by: RADIOLOGY

## 2023-11-22 PROCEDURE — 73206 CT ANGIO UPR EXTRM W/O&W/DYE: CPT | Mod: LT

## 2023-11-22 RX ORDER — IOPAMIDOL 755 MG/ML
100 INJECTION, SOLUTION INTRAVASCULAR ONCE
Status: COMPLETED | OUTPATIENT
Start: 2023-11-22 | End: 2023-11-22

## 2023-11-22 RX ORDER — IOPAMIDOL 755 MG/ML
72 INJECTION, SOLUTION INTRAVASCULAR ONCE
Status: DISCONTINUED | OUTPATIENT
Start: 2023-11-22 | End: 2023-11-22

## 2023-11-22 RX ADMIN — IOPAMIDOL 100 ML: 755 INJECTION, SOLUTION INTRAVENOUS at 08:27

## 2023-11-22 RX ADMIN — SODIUM CHLORIDE 100 ML: 9 INJECTION, SOLUTION INTRAVENOUS at 08:28

## 2023-12-15 ENCOUNTER — OFFICE VISIT (OUTPATIENT)
Dept: ORTHOPEDICS | Facility: CLINIC | Age: 23
End: 2023-12-15
Payer: COMMERCIAL

## 2023-12-15 VITALS — HEIGHT: 73 IN | BODY MASS INDEX: 22.82 KG/M2

## 2023-12-15 DIAGNOSIS — M25.842 CYST OF JOINT OF LEFT HAND: Primary | ICD-10-CM

## 2023-12-15 PROCEDURE — 20612 ASPIRATE/INJ GANGLION CYST: CPT | Mod: LT | Performed by: FAMILY MEDICINE

## 2023-12-15 PROCEDURE — 76942 ECHO GUIDE FOR BIOPSY: CPT | Performed by: FAMILY MEDICINE

## 2023-12-15 RX ORDER — BETAMETHASONE SODIUM PHOSPHATE AND BETAMETHASONE ACETATE 3; 3 MG/ML; MG/ML
6 INJECTION, SUSPENSION INTRA-ARTICULAR; INTRALESIONAL; INTRAMUSCULAR; SOFT TISSUE
Status: SHIPPED | OUTPATIENT
Start: 2023-12-15

## 2023-12-15 RX ORDER — ROPIVACAINE HYDROCHLORIDE 5 MG/ML
0.5 INJECTION, SOLUTION EPIDURAL; INFILTRATION; PERINEURAL
Status: SHIPPED | OUTPATIENT
Start: 2023-12-15

## 2023-12-15 RX ADMIN — BETAMETHASONE SODIUM PHOSPHATE AND BETAMETHASONE ACETATE 6 MG: 3; 3 INJECTION, SUSPENSION INTRA-ARTICULAR; INTRALESIONAL; INTRAMUSCULAR; SOFT TISSUE at 10:21

## 2023-12-15 RX ADMIN — ROPIVACAINE HYDROCHLORIDE 0.5 ML: 5 INJECTION, SOLUTION EPIDURAL; INFILTRATION; PERINEURAL at 10:21

## 2023-12-15 ASSESSMENT — PAIN SCALES - GENERAL: PAINLEVEL: NO PAIN (1)

## 2023-12-15 NOTE — PATIENT INSTRUCTIONS
# Left Wrist Mass: Vasquez Rizvi  was seen today for left wrist mass. Symptoms had been going on for 6 weeks without inciting injury. On examination there are positive findings of swelling over the anatomical snuffbox of the left wrist. Imaging findings showed no arthritis no fracture on left wrist x-rays, ultrasound showing hypoechoic mass adjacent to the radial artery in the anatomical snuffbox 1.5 x 1.8 x 2.0 cm. Reviewed left upper extremity CT showing no significant mass. Likely cause of patient's condition due to ganglion cyst. 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  Counseled patient on nature of condition and treatment options.  Given this plan as below, follow-up 3+ mon as needed     Image Findings: no wrist fracture, no writs artery mass on left upper extremity CTA  Treatment: Activities as tolerated  Job: As tolerated  Medications/Injections: Limited tylenol/ibuprofen for pain for 1-2 weeks, Topical Voltaren gel, left wrist cyst aspiration/steroid injection  Follow-up: as needed 3+ months    Please call [phone]   Ask for my team if you have any questions or concerns    If you have not yet received the influenza vaccine but would like to get one, please call  [phone] or you can schedule via [employer]    It was great seeing you again today!    Mick Jameson MD, [facility] Injection Discharge Instructions    Procedure: left wrist cyst aspiration/steroid injection    You may shower, however avoid swimming, tub baths or hot tubs for 24 hours following your procedure  You may have a mild to moderate increase in pain for several days following the injection.  It may take up to 14 days for the steroid medication to start working although you may feel the effect as early as a few days after the procedure.  You may use ice packs for 10-15 minutes, 3 to 4 times a day at the injection site for comfort  You may use anti-inflammatory  medications (such as Ibuprofen or Aleve or Advil) or Tylenol for pain control if necessary  If you were fasting, you may resume your normal diet and medications after the procedure  If you have diabetes, check your blood sugar more frequently than usual as your blood sugar may be higher than normal for 10-14 days following a steroid injection. Contact your doctor who manages your diabetes if your blood sugar is higher than usual    If you experience any of the following, call [facility] @ [phone] or [phone]  -Fever over 100 degree F  -Swelling, bleeding, redness, drainage, warmth at the injection site  - New or worsening pain

## 2023-12-15 NOTE — PROGRESS NOTES
ASSESSMENT & PLAN    Vasquez was seen today for pain and follow up.    Diagnoses and all orders for this visit:    Cyst of joint of left hand  -     Hand / Upper Extremity Injection/Arthrocentesis        # Left Wrist Mass: Vasquez Rizvi  was seen today for left wrist mass. Symptoms had been going on for 6 weeks without inciting injury. On examination there are positive findings of swelling over the anatomical snuffbox of the left wrist. Imaging findings showed no arthritis no fracture on left wrist x-rays, ultrasound showing hypoechoic mass adjacent to the radial artery in the anatomical snuffbox 1.5 x 1.8 x 2.0 cm. Reviewed left upper extremity CT showing no significant mass. Likely cause of patient's condition due to ganglion cyst. 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  Counseled patient on nature of condition and treatment options.  Given this plan as below, follow-up 3+ mon as needed     Image Findings: no wrist fracture, no writs artery mass on left upper extremity CTA  Treatment: Activities as tolerated  Job: As tolerated  Medications/Injections: Limited tylenol/ibuprofen for pain for 1-2 weeks, Topical Voltaren gel, left wrist cyst aspiration/steroid injection  Follow-up: as needed 3+ months    -----    SUBJECTIVE:  Vasquez Rizvi is a 23 year old male who is seen in follow-up for left wrist pain. They were last seen 11/20/2023.  The patient is seen with their mother.    Since their last visit reports persisting wrist pain.  Here to review imaging completed on 11/22/23. They indicate that their current pain level is 1/10. They have tried  rest/activity avoidance.        Patient's past medical, surgical, social, and family histories were reviewed today and no changes are noted.    REVIEW OF SYSTEMS:  Constitutional: NEGATIVE for fever, chills, change in weight  Skin: NEGATIVE for worrisome rashes, moles or lesions  GI/: NEGATIVE for bowel or bladder  "changes  Neuro: NEGATIVE for weakness, dizziness or paresthesias    OBJECTIVE:  Ht 1.854 m (6' 1\")   BMI 22.82 kg/m     General: healthy, alert and in no distress  HEENT: no scleral icterus or conjunctival erythema  Skin: no suspicious lesions or rash. No jaundice.  CV: regular rhythm by palpation, no pedal edema  Resp: normal respiratory effort without conversational dyspnea   Psych: normal mood and affect  Gait: normal steady gait with appropriate coordination and balance  Neuro: normal light touch sensory exam of the extremities.    MSK:    LEFT HAND  Inspection:   Swelling over 1st webspace  Palpation:   Carpals: normal   Metacarpals: normal   Thumb: palpable swelling over 1st webspace   Fingers: normal  Range of Motion:    Full active flexion and extension at MCP, PIP, and DIP joints; normal finger cascade without malrotation.  Wrist pronation, supination, and ulnar/radial deviation normal.  Strength:     full, extension full, flexion full, abduction full, adduction full, opposition full  Special Tests:    Positive: none    Negative: flexor digitorum superficialis testing, flexor digitorum profundus testing    Independent visualization of the below image:  Results for orders placed or performed during the hospital encounter of 11/22/23   CTA Upper Extremity Left Runoff with Contrast    Narrative    CTA UPPER EXTREMITY LEFT RUNOFF WITH CONTRAST   11/22/2023 8:56 AM     HISTORY: 3-week history of a mass in snuffbox adjacent to the radial  artery rule out pseudoaneurysm versus ganglion cyst; Mass of joint of  left wrist.    TECHNIQUE: CTA of the left upper extremity with 100mL of Isovue 370  IV. Radiation dose for this scan was reduced using automated exposure  control, adjustment of the mA and/or kV according to patient size, or  iterative reconstruction technique. 3-D images were created at an  independent workstation under concurrent supervision at the request of  the ordering provider.    COMPARISON: " None.    FINDINGS:   Vasculature: The ascending thoracic aorta is not completely included  in the field-of-view. Where visualized the thoracic aorta is patent  without evidence of aneurysm, dissection or stenosis. Normal branching  pattern of the great vessels. Great vessels are patent. The left  subclavian, axillary, brachial, profunda brachial, radial and ulnar  arteries are patent to the level of the wrist. Distal to that they are  not well visualized including on delayed imaging. No pseudoaneurysm or  vascular abnormality noted in the left hand or wrist.    The abdominal aorta is patent without evidence of aneurysm, dissection  or stenosis. The celiac access, superior mesenteric artery, left renal  artery and inferior mesenteric artery are all patent. The proximal  right common iliac artery is patent. Left common iliac, internal  iliac, external iliac, common femoral, proximal profunda femoral and  proximal superficial femoral arteries are patent. Origin of the left  renal artery is patent.    Chest: No right pleural effusion or pericardial effusion. Right lung  is clear. Visualized central airways are patent. Visualized pulmonary  arteries on the left show no pulmonary embolus.    Abdomen: Evaluation of solid organ parenchyma is limited secondary to  contrast bolus timing. The arterial enhanced appearance of the spleen,  left kidney, left adrenal gland, pancreatic tail and bowel show no  focal abnormality. Visualized portions of the bladder are normal.    Musculoskeletal: No suspicious bony lesions. No definite soft tissue  mass, however this exam is not tailored for the evaluation of the soft  tissues.      Impression    IMPRESSION:  Patent arteries throughout the left upper extremity. The radial and  ulnar arteries are patent to the level of the wrist, the arteries are  not well visualized into the hand. No vascular abnormality noted at  the left wrist/hand. Evaluation of soft tissues is  significantly  limited on this CT due to it being tailored for the evaluation of the  arteries, if there is ongoing concern for a soft tissue mass MRI  contrast could be performed.     SONIA VEGA DO         SYSTEM ID:  [id number]       Mick Jameson MD, [facility]    Disclaimer: This note consists of symbols derived from keyboarding, dictation and/or voice recognition software. As a result, there may be errors in the script that have gone undetected. Please consider this when interpreting information found in this chart.    Hand / Upper Extremity Injection/Arthrocentesis    Date/Time: 12/15/2023 10:21 AM    Performed by: Mick Jameson MD  Authorized by: Mick Jameson MD    Indications:  Pain  Needle Size:  18 G  Guidance: ultrasound    Approach:  Dorsal   Condition comment:  Hand cyst     Medications:  0.5 mL ROPivacaine 5 MG/ML; 6 mg betamethasone acet & sod phos 6 (3-3) MG/ML  Medications comment:  Actual amount of celestone use 0.5 mL  Aspirate amount (mL):  3  Aspirate:  Gel like and clear  Outcome:  Tolerated well, no immediate complications  Procedure discussed: discussed risks, benefits, and alternatives    Consent Given by:  Patient  Timeout: timeout called immediately prior to procedure    Prep: patient was prepped and draped in usual sterile fashion     Ultrasound images of procedure were permanently stored.     Patient reported significant improvement of pain after the numbing portion of left dorsal/radial hand cyst aspiration/steroid injection.  Ultrasound guided images were permanently stored.   Aftercare instructions given to patient.  Plan to follow-up as discussed above.     Mick Jameson MD [facility]

## 2023-12-15 NOTE — LETTER
12/15/2023         RE: Vasquez Rizvi  [address]  [place] [address]        Dear Colleague,    Thank you for referring your patient, Vasquez Rizvi, to the [facility] [place]. Please see a copy of my visit note below.    ASSESSMENT & PLAN    Vasquez was seen today for pain and follow up.    Diagnoses and all orders for this visit:    Cyst of joint of left hand  -     Hand / Upper Extremity Injection/Arthrocentesis        # Left Wrist Mass: Vasquez Rizvi  was seen today for left wrist mass. Symptoms had been going on for 6 weeks without inciting injury. On examination there are positive findings of swelling over the anatomical snuffbox of the left wrist. Imaging findings showed no arthritis no fracture on left wrist x-rays, ultrasound showing hypoechoic mass adjacent to the radial artery in the anatomical snuffbox 1.5 x 1.8 x 2.0 cm. Reviewed left upper extremity CT showing no significant mass. Likely cause of patient's condition due to ganglion cyst. 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  Counseled patient on nature of condition and treatment options.  Given this plan as below, follow-up 3+ mon as needed     Image Findings: no wrist fracture, no writs artery mass on left upper extremity CTA  Treatment: Activities as tolerated  Job: As tolerated  Medications/Injections: Limited tylenol/ibuprofen for pain for 1-2 weeks, Topical Voltaren gel, left wrist cyst aspiration/steroid injection  Follow-up: as needed 3+ months    -----    SUBJECTIVE:  Vasquez Rizvi is a 23 year old male who is seen in follow-up for left wrist pain. They were last seen 11/20/2023.  The patient is seen with their mother.    Since their last visit reports persisting wrist pain.  Here to review imaging completed on 11/22/23. They indicate that their current pain level is 1/10. They have tried  rest/activity avoidance.        Patient's past medical,  "surgical, social, and family histories were reviewed today and no changes are noted.    REVIEW OF SYSTEMS:  Constitutional: NEGATIVE for fever, chills, change in weight  Skin: NEGATIVE for worrisome rashes, moles or lesions  GI/: NEGATIVE for bowel or bladder changes  Neuro: NEGATIVE for weakness, dizziness or paresthesias    OBJECTIVE:  Ht 1.854 m (6' 1\")   BMI 22.82 kg/m     General: healthy, alert and in no distress  HEENT: no scleral icterus or conjunctival erythema  Skin: no suspicious lesions or rash. No jaundice.  CV: regular rhythm by palpation, no pedal edema  Resp: normal respiratory effort without conversational dyspnea   Psych: normal mood and affect  Gait: normal steady gait with appropriate coordination and balance  Neuro: normal light touch sensory exam of the extremities.    MSK:    LEFT HAND  Inspection:   Swelling over 1st webspace  Palpation:   Carpals: normal   Metacarpals: normal   Thumb: palpable swelling over 1st webspace   Fingers: normal  Range of Motion:    Full active flexion and extension at MCP, PIP, and DIP joints; normal finger cascade without malrotation.  Wrist pronation, supination, and ulnar/radial deviation normal.  Strength:     full, extension full, flexion full, abduction full, adduction full, opposition full  Special Tests:    Positive: none    Negative: flexor digitorum superficialis testing, flexor digitorum profundus testing    Independent visualization of the below image:  Results for orders placed or performed during the hospital encounter of 11/22/23   CTA Upper Extremity Left Runoff with Contrast    Narrative    CTA UPPER EXTREMITY LEFT RUNOFF WITH CONTRAST   11/22/2023 8:56 AM     HISTORY: 3-week history of a mass in snuffbox adjacent to the radial  artery rule out pseudoaneurysm versus ganglion cyst; Mass of joint of  left wrist.    TECHNIQUE: CTA of the left upper extremity with 100mL of Isovue 370  IV. Radiation dose for this scan was reduced using automated " exposure  control, adjustment of the mA and/or kV according to patient size, or  iterative reconstruction technique. 3-D images were created at an  independent workstation under concurrent supervision at the request of  the ordering provider.    COMPARISON: None.    FINDINGS:   Vasculature: The ascending thoracic aorta is not completely included  in the field-of-view. Where visualized the thoracic aorta is patent  without evidence of aneurysm, dissection or stenosis. Normal branching  pattern of the great vessels. Great vessels are patent. The left  subclavian, axillary, brachial, profunda brachial, radial and ulnar  arteries are patent to the level of the wrist. Distal to that they are  not well visualized including on delayed imaging. No pseudoaneurysm or  vascular abnormality noted in the left hand or wrist.    The abdominal aorta is patent without evidence of aneurysm, dissection  or stenosis. The celiac access, superior mesenteric artery, left renal  artery and inferior mesenteric artery are all patent. The proximal  right common iliac artery is patent. Left common iliac, internal  iliac, external iliac, common femoral, proximal profunda femoral and  proximal superficial femoral arteries are patent. Origin of the left  renal artery is patent.    Chest: No right pleural effusion or pericardial effusion. Right lung  is clear. Visualized central airways are patent. Visualized pulmonary  arteries on the left show no pulmonary embolus.    Abdomen: Evaluation of solid organ parenchyma is limited secondary to  contrast bolus timing. The arterial enhanced appearance of the spleen,  left kidney, left adrenal gland, pancreatic tail and bowel show no  focal abnormality. Visualized portions of the bladder are normal.    Musculoskeletal: No suspicious bony lesions. No definite soft tissue  mass, however this exam is not tailored for the evaluation of the soft  tissues.      Impression    IMPRESSION:  Patent arteries  throughout the left upper extremity. The radial and  ulnar arteries are patent to the level of the wrist, the arteries are  not well visualized into the hand. No vascular abnormality noted at  the left wrist/hand. Evaluation of soft tissues is significantly  limited on this CT due to it being tailored for the evaluation of the  arteries, if there is ongoing concern for a soft tissue mass MRI  contrast could be performed.     SONIA VEGA DO         SYSTEM ID:  [id number]       Mick Jameson MD, [facility]    Disclaimer: This note consists of symbols derived from keyboarding, dictation and/or voice recognition software. As a result, there may be errors in the script that have gone undetected. Please consider this when interpreting information found in this chart.    Hand / Upper Extremity Injection/Arthrocentesis    Date/Time: 12/15/2023 10:21 AM    Performed by: Mick Jameson MD  Authorized by: Mick Jameson MD    Indications:  Pain  Needle Size:  18 G  Guidance: ultrasound    Approach:  Dorsal   Condition comment:  Hand cyst     Medications:  0.5 mL ROPivacaine 5 MG/ML; 6 mg betamethasone acet & sod phos 6 (3-3) MG/ML  Medications comment:  Actual amount of celestone use 0.5 mL  Aspirate amount (mL):  3  Aspirate:  Gel like and clear  Outcome:  Tolerated well, no immediate complications  Procedure discussed: discussed risks, benefits, and alternatives    Consent Given by:  Patient  Timeout: timeout called immediately prior to procedure    Prep: patient was prepped and draped in usual sterile fashion     Ultrasound images of procedure were permanently stored.     Patient reported significant improvement of pain after the numbing portion of left dorsal/radial hand cyst aspiration/steroid injection.  Ultrasound guided images were permanently stored.   Aftercare instructions given to patient.  Plan to follow-up as discussed above.     Mick Jameson MD [facility]  [facility] Orthopedic [facility]             Again, thank you for allowing me to participate in the care of your patient.        Sincerely,        Mick Jameson MD

## 2024-02-10 ENCOUNTER — HEALTH MAINTENANCE LETTER (OUTPATIENT)
Age: 24
End: 2024-02-10

## 2024-08-11 ENCOUNTER — APPOINTMENT (OUTPATIENT)
Dept: GENERAL RADIOLOGY | Facility: CLINIC | Age: 24
End: 2024-08-11
Attending: PHYSICIAN ASSISTANT
Payer: COMMERCIAL

## 2024-08-11 ENCOUNTER — APPOINTMENT (OUTPATIENT)
Dept: CT IMAGING | Facility: CLINIC | Age: 24
End: 2024-08-11
Attending: PHYSICIAN ASSISTANT
Payer: COMMERCIAL

## 2024-08-11 ENCOUNTER — HOSPITAL ENCOUNTER (EMERGENCY)
Facility: CLINIC | Age: 24
Discharge: HOME | End: 2024-08-11
Attending: EMERGENCY MEDICINE | Admitting: EMERGENCY MEDICINE
Payer: COMMERCIAL

## 2024-08-11 VITALS
TEMPERATURE: 97.9 F | SYSTOLIC BLOOD PRESSURE: 110 MMHG | DIASTOLIC BLOOD PRESSURE: 68 MMHG | OXYGEN SATURATION: 98 % | RESPIRATION RATE: 18 BRPM | HEART RATE: 68 BPM

## 2024-08-11 DIAGNOSIS — R55 SYNCOPE: ICD-10-CM

## 2024-08-11 LAB
ALBUMIN SERPL BCG-MCNC: 4.8 G/DL (ref 3.5–5.2)
ALP SERPL-CCNC: 57 U/L (ref 40–150)
ALT SERPL W P-5'-P-CCNC: 7 U/L (ref 0–70)
AMPHETAMINES UR QL SCN: ABNORMAL
ANION GAP SERPL CALCULATED.3IONS-SCNC: 9 MMOL/L (ref 7–15)
AST SERPL W P-5'-P-CCNC: 16 U/L (ref 0–45)
BARBITURATES UR QL SCN: ABNORMAL
BASOPHILS # BLD AUTO: 0.1 10E3/UL (ref 0–0.2)
BASOPHILS NFR BLD AUTO: 1 %
BENZODIAZ UR QL SCN: ABNORMAL
BILIRUB SERPL-MCNC: 0.7 MG/DL
BUN SERPL-MCNC: 9 MG/DL (ref 6–20)
BZE UR QL SCN: ABNORMAL
CALCIUM SERPL-MCNC: 9.5 MG/DL (ref 8.8–10.4)
CANNABINOIDS UR QL SCN: ABNORMAL
CHLORIDE SERPL-SCNC: 104 MMOL/L (ref 98–107)
CREAT SERPL-MCNC: 1.17 MG/DL (ref 0.67–1.17)
EGFRCR SERPLBLD CKD-EPI 2021: 89 ML/MIN/1.73M2
EOSINOPHIL # BLD AUTO: 0.2 10E3/UL (ref 0–0.7)
EOSINOPHIL NFR BLD AUTO: 4 %
ERYTHROCYTE [DISTWIDTH] IN BLOOD BY AUTOMATED COUNT: 12.1 % (ref 10–15)
ETHANOL SERPL-MCNC: <0.01 G/DL
FENTANYL UR QL: ABNORMAL
GLUCOSE SERPL-MCNC: 97 MG/DL (ref 70–99)
HCO3 SERPL-SCNC: 26 MMOL/L (ref 22–29)
HCT VFR BLD AUTO: 44 % (ref 40–53)
HGB BLD-MCNC: 14.7 G/DL (ref 13.3–17.7)
IMM GRANULOCYTES # BLD: 0 10E3/UL
IMM GRANULOCYTES NFR BLD: 0 %
LYMPHOCYTES # BLD AUTO: 1.1 10E3/UL (ref 0.8–5.3)
LYMPHOCYTES NFR BLD AUTO: 18 %
MCH RBC QN AUTO: 32.5 PG (ref 26.5–33)
MCHC RBC AUTO-ENTMCNC: 33.4 G/DL (ref 31.5–36.5)
MCV RBC AUTO: 97 FL (ref 78–100)
MONOCYTES # BLD AUTO: 0.5 10E3/UL (ref 0–1.3)
MONOCYTES NFR BLD AUTO: 8 %
NEUTROPHILS # BLD AUTO: 4.5 10E3/UL (ref 1.6–8.3)
NEUTROPHILS NFR BLD AUTO: 69 %
NRBC # BLD AUTO: 0 10E3/UL
NRBC BLD AUTO-RTO: 0 /100
OPIATES UR QL SCN: ABNORMAL
PCP QUAL URINE (ROCHE): ABNORMAL
PLATELET # BLD AUTO: 230 10E3/UL (ref 150–450)
POTASSIUM SERPL-SCNC: 4.3 MMOL/L (ref 3.4–5.3)
PROT SERPL-MCNC: 7.5 G/DL (ref 6.4–8.3)
RBC # BLD AUTO: 4.52 10E6/UL (ref 4.4–5.9)
SODIUM SERPL-SCNC: 139 MMOL/L (ref 135–145)
TROPONIN T SERPL HS-MCNC: <6 NG/L
WBC # BLD AUTO: 6.4 10E3/UL (ref 4–11)

## 2024-08-11 PROCEDURE — 71046 X-RAY EXAM CHEST 2 VIEWS: CPT | Mod: 26 | Performed by: RADIOLOGY

## 2024-08-11 PROCEDURE — 70450 CT HEAD/BRAIN W/O DYE: CPT | Mod: 26 | Performed by: RADIOLOGY

## 2024-08-11 PROCEDURE — 80307 DRUG TEST PRSMV CHEM ANLYZR: CPT | Performed by: PHYSICIAN ASSISTANT

## 2024-08-11 PROCEDURE — 85025 COMPLETE CBC W/AUTO DIFF WBC: CPT | Performed by: PHYSICIAN ASSISTANT

## 2024-08-11 PROCEDURE — 93005 ELECTROCARDIOGRAM TRACING: CPT | Performed by: EMERGENCY MEDICINE

## 2024-08-11 PROCEDURE — 84484 ASSAY OF TROPONIN QUANT: CPT | Performed by: PHYSICIAN ASSISTANT

## 2024-08-11 PROCEDURE — 70450 CT HEAD/BRAIN W/O DYE: CPT

## 2024-08-11 PROCEDURE — 36415 COLL VENOUS BLD VENIPUNCTURE: CPT | Performed by: PHYSICIAN ASSISTANT

## 2024-08-11 PROCEDURE — 93010 ELECTROCARDIOGRAM REPORT: CPT | Performed by: EMERGENCY MEDICINE

## 2024-08-11 PROCEDURE — 99285 EMERGENCY DEPT VISIT HI MDM: CPT | Mod: 25 | Performed by: EMERGENCY MEDICINE

## 2024-08-11 PROCEDURE — 80053 COMPREHEN METABOLIC PANEL: CPT | Performed by: PHYSICIAN ASSISTANT

## 2024-08-11 PROCEDURE — 82077 ASSAY SPEC XCP UR&BREATH IA: CPT | Performed by: PHYSICIAN ASSISTANT

## 2024-08-11 PROCEDURE — 71046 X-RAY EXAM CHEST 2 VIEWS: CPT

## 2024-08-11 PROCEDURE — 99284 EMERGENCY DEPT VISIT MOD MDM: CPT | Mod: FS | Performed by: EMERGENCY MEDICINE

## 2024-08-11 ASSESSMENT — ACTIVITIES OF DAILY LIVING (ADL)
ADLS_ACUITY_SCORE: 35

## 2024-08-11 ASSESSMENT — COLUMBIA-SUICIDE SEVERITY RATING SCALE - C-SSRS
1. IN THE PAST MONTH, HAVE YOU WISHED YOU WERE DEAD OR WISHED YOU COULD GO TO SLEEP AND NOT WAKE UP?: NO
2. HAVE YOU ACTUALLY HAD ANY THOUGHTS OF KILLING YOURSELF IN THE PAST MONTH?: NO
6. HAVE YOU EVER DONE ANYTHING, STARTED TO DO ANYTHING, OR PREPARED TO DO ANYTHING TO END YOUR LIFE?: NO

## 2024-08-11 NOTE — ED TRIAGE NOTES
Ambulatory to triage for seizures  1 minute seizure yesterday while camping, witnessed by family  Seizure history, last one 6 years ago, has never seen an MD for this     Triage Assessment (Adult)       Row Name 08/11/24 6378          Triage Assessment    Airway WDL WDL        Respiratory WDL    Respiratory WDL WDL        Skin Circulation/Temperature WDL    Skin Circulation/Temperature WDL WDL        Cardiac WDL    Cardiac WDL WDL        Peripheral/Neurovascular WDL    Peripheral Neurovascular WDL WDL        Cognitive/Neuro/Behavioral WDL    Cognitive/Neuro/Behavioral WDL WDL

## 2024-08-11 NOTE — ED PROVIDER NOTES
"ED Provider Note  [facility]      History     Chief Complaint   Patient presents with    Seizures     HPI  Vasquez Rizvi is a 23 year old male with a history of ADHD, polysubstance dependence, depression, autism spectrum disorder, and DIDI who presents to the emergency department for evaluation of possible seizure-like activity.  Patient is here with his mom.    They note last night patient was sitting around a campfire, after consuming vodka patient unable to quantify the volume.  He notes that he did start feeling fatigued was nodding his head up and down like he was going to fall asleep and notes thereafter he felt \"static\" across his visual field.  He does not recall what happened next.  He tells me that his family members noted that he did pass out, and shortly after experienced whole body shaking episode lasting about a minute.  Patient thereafter tells me that he came to go though felt \"out of it\" for a few minutes thereafter.  Patient was sitting in a lawn chair when the episode occurred, he did not fall or hit his head.  He did bite his inner lower lip when the episode occurred.  No loss of bladder or bowel function.  Patient states that today he does have some mild posterior head tightness type feeling which is improving.  He notes no associated fevers chest pain dyspnea abdominal pain vomiting urinary symptoms diarrhea or constipation.  He notes he uses THC and alcohol denies other drug use denies other recreational drugs.  No history of seizure disorder he states he did have a similar episode with the \"static\" vision about 6 years ago when he was sitting with his friends which she was never evaluated for.  He has no known history of heart problems does report a family member at a young age presumably passing away from sudden cardiac death in their sleep he does not know much more history than this.  Patient has been able to eat and drink today.  He does not have a primary " care provider.    Past Medical History  Past Medical History:   Diagnosis Date    Asperger's disorder     Depressive disorder     Generalized anxiety disorder      No past surgical history on file.  lisdexamfetamine (VYVANSE) 30 MG capsule  lisdexamfetamine (VYVANSE) 30 MG capsule      Allergies   Allergen Reactions    Adhesive Tape Rash     Family History  Family History   Problem Relation Age of Onset    Colon Polyps Mother     Obsessive Compulsive Disorder Maternal Aunt     Depression Maternal Aunt     Depression Maternal Uncle     Obsessive Compulsive Disorder Maternal Uncle     Diabetes Paternal Grandmother     Diabetes Type 2  Paternal Grandmother     Obsessive Compulsive Disorder Cousin     Depression Cousin      Social History   Social History     Tobacco Use    Smoking status: Former     Current packs/day: 0.00     Average packs/day: 0.5 packs/day for 2.0 years (1.0 ttl pk-yrs)     Types: Cigarettes     Start date: 7/15/2015     Quit date: 7/15/2017     Years since quittin.0    Smokeless tobacco: Former     Types: Chew    Tobacco comments:     Only chewed a couple of times   Vaping Use    Vaping status: Every Day    Substances: Nicotine, THC, Flavoring    Devices: Disposable   Substance Use Topics    Alcohol use: Yes     Comment: occasional    Drug use: Yes     Types: Marijuana, Amphetamines, Hydrocodone, Oxycodone     Comment: currently doing adderall      A medically appropriate review of systems was performed with pertinent positives and negatives noted in the HPI, and all other systems negative.    Physical Exam   BP: 116/75  Pulse: 68  Temp: 98.1  F (36.7  C)  Resp: 18  SpO2: 98 %  Physical Exam    GENERAL APPEARANCE: The patient is well developed, well appearing, and in no acute distress.  HEAD:  Normocephalic and atraumatic.   EENT: Voice normal.  Oral mucosa moist.  There is superficial wound noted to the mucosal aspect of the lower lip without any visible tongue laceration.  NECK: Trachea is  midline.No lymphadenopathy or tenderness.  LUNGS: Breath sounds are equal and clear bilaterally. No wheezes, rhonchi, or rales.  HEART: Regular rate and normal rhythm.    ABDOMEN: Soft, flat, and benign. No mass, tenderness, guarding, or rebound.Bowel sounds are present.  EXTREMITIES: No cyanosis, clubbing, or edema.  NEUROLOGIC: No focal sensory or motor deficits are noted.  Cranial nerves II through XII are intact.  Rapid alternating movements, finger-to-nose testing intact.   strength is 5 out of 5 bilaterally.  PSYCHIATRIC: The patient is awake, alert.  Appropriate mood and affect.  SKIN: Warm, dry, and well perfused. Good turgor.      ED Course, Procedures, & Data      EKG 8/11/2024:  Normal sinus rhythm, ventricular rate 65 QTc 397.  When interpreted by myself the rhythm is regular.  There is a P wave before every QRS complex.  No visible ST elevation or depression to suggest ischemia.  Suspect early repolarization in the lateral leads.  EKG compared to prior from 11/10/2021 and appears similar.     Results for orders placed or performed during the hospital encounter of 08/11/24   XR Chest 2 Views     Status: None    Narrative    Exam: XR CHEST 2 VIEWS, 8/11/2024 3:55 PM    Comparison: Chest radiograph 11/11/2021    History: syncope    Findings:  PA and lateral views of the chest. Cardiac silhouette is within normal  limits. No pneumothorax or pleural effusion. No acute airspace  opacity. No acute osseous abnormality.      Impression    Impression: No acute airspace disease.    I have personally reviewed the examination and initial interpretation  and I agree with the findings.    LEANA GOMEZ MD         SYSTEM ID:  [id number]   Head CT w/o contrast     Status: None    Narrative    EXAM: CT HEAD W/O CONTRAST  8/11/2024 4:29 PM     HISTORY:  new seizure?       COMPARISON:  None    TECHNIQUE: Using multidetector thin collimation helical acquisition  technique, axial, coronal and sagittal CT images from the  skull base  to the vertex were obtained without intravenous contrast.   (topogram) image(s) also obtained and reviewed.    FINDINGS:  No acute intracranial hemorrhage, mass effect, or midline shift. No  acute loss of gray-white matter differentiation in the cerebral  hemispheres. Ventricles are proportionate to the cerebral sulci. Clear  basal cisterns.    The bony calvaria and the bones of the skull base are normal. The  visualized portions of the paranasal sinuses and mastoid air cells are  clear. Grossly normal orbits.       Impression    IMPRESSION: No acute intracranial pathology.     I have personally reviewed the examination and initial interpretation  and I agree with the findings.    ITZ MARTINEZ MD         SYSTEM ID:  [id number]   Comprehensive metabolic panel     Status: Normal   Result Value Ref Range    Sodium 139 135 - 145 mmol/L    Potassium 4.3 3.4 - 5.3 mmol/L    Carbon Dioxide (CO2) 26 22 - 29 mmol/L    Anion Gap 9 7 - 15 mmol/L    Urea Nitrogen 9.0 6.0 - 20.0 mg/dL    Creatinine 1.17 0.67 - 1.17 mg/dL    GFR Estimate 89 >60 mL/min/1.73m2    Calcium 9.5 8.8 - 10.4 mg/dL    Chloride 104 98 - 107 mmol/L    Glucose 97 70 - 99 mg/dL    Alkaline Phosphatase 57 40 - 150 U/L    AST 16 0 - 45 U/L    ALT 7 0 - 70 U/L    Protein Total 7.5 6.4 - 8.3 g/dL    Albumin 4.8 3.5 - 5.2 g/dL    Bilirubin Total 0.7 <=1.2 mg/dL   Alcohol     Status: Normal   Result Value Ref Range    Alcohol ethyl <0.01 <=0.01 g/dL   Troponin T, High Sensitivity     Status: Normal   Result Value Ref Range    Troponin T, High Sensitivity <6 <=22 ng/L   CBC with platelets and differential     Status: None   Result Value Ref Range    WBC Count 6.4 4.0 - 11.0 10e3/uL    RBC Count 4.52 4.40 - 5.90 10e6/uL    Hemoglobin 14.7 13.3 - 17.7 g/dL    Hematocrit 44.0 40.0 - 53.0 %    MCV 97 78 - 100 fL    MCH 32.5 26.5 - 33.0 pg    MCHC 33.4 31.5 - 36.5 g/dL    RDW 12.1 10.0 - 15.0 %    Platelet Count 230 150 - 450 10e3/uL    % Neutrophils 69  %    % Lymphocytes 18 %    % Monocytes 8 %    % Eosinophils 4 %    % Basophils 1 %    % Immature Granulocytes 0 %    NRBCs per 100 WBC 0 <1 /100    Absolute Neutrophils 4.5 1.6 - 8.3 10e3/uL    Absolute Lymphocytes 1.1 0.8 - 5.3 10e3/uL    Absolute Monocytes 0.5 0.0 - 1.3 10e3/uL    Absolute Eosinophils 0.2 0.0 - 0.7 10e3/uL    Absolute Basophils 0.1 0.0 - 0.2 10e3/uL    Absolute Immature Granulocytes 0.0 <=0.4 10e3/uL    Absolute NRBCs 0.0 10e3/uL   Urine Drug Screen Panel     Status: Abnormal   Result Value Ref Range    Amphetamines Urine Screen Negative Screen Negative    Barbituates Urine Screen Negative Screen Negative    Benzodiazepine Urine Screen Negative Screen Negative    Cannabinoids Urine Screen Positive (A) Screen Negative    Cocaine Urine Screen Negative Screen Negative    Fentanyl Qual Urine Screen Negative Screen Negative    Opiates Urine Screen Negative Screen Negative    PCP Urine Screen Negative Screen Negative   CBC with platelets differential     Status: None    Narrative    The following orders were created for panel order CBC with platelets differential.  Procedure                               Abnormality         Status                     ---------                               -----------         ------                     CBC with platelets and d...[[id number]]                      Final result                 Please view results for these tests on the individual orders.   Urine Drug Screen     Status: Abnormal    Narrative    The following orders were created for panel order Urine Drug Screen.  Procedure                               Abnormality         Status                     ---------                               -----------         ------                     Urine Drug Screen Panel[[id number]]      Abnormal            Final result                 Please view results for these tests on the individual orders.     Medications - No data to display  Labs Ordered and Resulted from Time of  ED Arrival to Time of ED Departure   URINE DRUG SCREEN PANEL - Abnormal       Result Value    Amphetamines Urine Screen Negative      Barbituates Urine Screen Negative      Benzodiazepine Urine Screen Negative      Cannabinoids Urine Screen Positive (*)     Cocaine Urine Screen Negative      Fentanyl Qual Urine Screen Negative      Opiates Urine Screen Negative      PCP Urine Screen Negative     COMPREHENSIVE METABOLIC PANEL - Normal    Sodium 139      Potassium 4.3      Carbon Dioxide (CO2) 26      Anion Gap 9      Urea Nitrogen 9.0      Creatinine 1.17      GFR Estimate 89      Calcium 9.5      Chloride 104      Glucose 97      Alkaline Phosphatase 57      AST 16      ALT 7      Protein Total 7.5      Albumin 4.8      Bilirubin Total 0.7     ETHYL ALCOHOL LEVEL - Normal    Alcohol ethyl <0.01     TROPONIN T, HIGH SENSITIVITY - Normal    Troponin T, High Sensitivity <6     CBC WITH PLATELETS AND DIFFERENTIAL    WBC Count 6.4      RBC Count 4.52      Hemoglobin 14.7      Hematocrit 44.0      MCV 97      MCH 32.5      MCHC 33.4      RDW 12.1      Platelet Count 230      % Neutrophils 69      % Lymphocytes 18      % Monocytes 8      % Eosinophils 4      % Basophils 1      % Immature Granulocytes 0      NRBCs per 100 WBC 0      Absolute Neutrophils 4.5      Absolute Lymphocytes 1.1      Absolute Monocytes 0.5      Absolute Eosinophils 0.2      Absolute Basophils 0.1      Absolute Immature Granulocytes 0.0      Absolute NRBCs 0.0       Head CT w/o contrast   Final Result   IMPRESSION: No acute intracranial pathology.       I have personally reviewed the examination and initial interpretation   and I agree with the findings.      ITZ MARTINEZ MD            SYSTEM ID:  [id number]      XR Chest 2 Views   Final Result   Impression: No acute airspace disease.      I have personally reviewed the examination and initial interpretation   and I agree with the findings.      LEANA GOMEZ MD            SYSTEM ID:  [id number]              Critical care was not performed.     Medical Decision Making  The patient's presentation was of high complexity (an acute health issue posing potential threat to life or bodily function).    The patient's evaluation involved:  ordering and/or review of 3+ test(s) in this encounter (see separate area of note for details)  independent interpretation of testing performed by another health professional (see separate area of note for details)    The patient's management necessitated only low risk treatment.    Assessment & Plan    This is a 23-year-old male without chronic medical problems presenting with concerns for episode of transient level of awareness with report of syncope and generalized muscle spasming last night while at the [facility] under the influence of alcohol.  On presentation to the department vital signs are reviewed, within reference range patient not tachycardic, hypoxic, or febrile.  On exam he has clear breath sounds, soft abdomen, no focal neurologic deficits.    Initial differential is broad.  Based on constellation of symptoms and history, not clear-cut syncope or seizure.  Considered both on the differential amongst other specific etiologies including vasovagal syncope, orthostatic etiology of lower likelihood without positional change of patient during the episode, patient's alcohol use may have contributed causing hypovolemia.  Lower suspicion of cardiogenic syncope without exertional component and description of symptoms.  Will initiate workup to further evaluate including screening labs, EKG, head CT, chest x-ray troponin, UDS.  Considered PE in the differential as well of lesser likelihood in the absence of chest pain dyspnea reassuring vitals here in the ER  Do not feel workup for this is indicated at this time.    Initial EKG without arrhythmias or findings to suggest ischemia.  CBC without leukocytosis or anemia, chemistry shows normal electrolytes, creatinine and troponin.  UDS  positive for cannabinoids consistent with patient's history.  Chest x-ray on my interpretation shows no obvious consolidation radiologist overread negative.  CT head negative for mass bleed or other acute intracranial process.  EtOH level 0.    Discussed with patient these reassuring results.  At this time still unclear etiology for patient's episode favor higher suspicion of syncope versus first-time seizure.  We discussed the importance of following up with neurology I did place a referral.  Also placed a referral to follow-up with primary care prior to discharge patient had already heard from primary care and scheduled appointment.  We discussed no driving until he is evaluated by neurology.  He does not need to drive for his job.  We did discuss return precautions.  Patient has no other questions or concerns at this time.  Red flag signs were addressed, and they were in agreement with the patient care plan provided.    Patient seen and discussed with attending physician , who agrees with my plan of care.    --    ED Attending Physician Attestation    I Jarred Leon DO, cared for this patient with the Advanced Practice Provider (ERICK). I personally provided a substantive portion of the care for this patient, including approving the care plan for the number and complexity of problems addressed and taking responsibility related to the risk of complications and/or morbidity or mortality of patient management. Please see the ERICK's documentation for full details.          Jarred Leon DO  Emergency Medicine      I have reviewed the nursing notes. I have reviewed the findings, diagnosis, plan and need for follow up with the patient.    Discharge Medication List as of 8/11/2024  8:06 PM          Final diagnoses:   Syncope          [facility] EMERGENCY DEPARTMENT  8/11/2024     Hui Ge PA-C  08/11/24 5268       Jarred Leon DO  08/11/24 7271

## 2024-08-12 LAB
ATRIAL RATE - MUSE: 66 BPM
DIASTOLIC BLOOD PRESSURE - MUSE: NORMAL MMHG
INTERPRETATION ECG - MUSE: NORMAL
P AXIS - MUSE: 68 DEGREES
PR INTERVAL - MUSE: 166 MS
QRS DURATION - MUSE: 96 MS
QT - MUSE: 378 MS
QTC - MUSE: 396 MS
R AXIS - MUSE: 84 DEGREES
SYSTOLIC BLOOD PRESSURE - MUSE: NORMAL MMHG
T AXIS - MUSE: 36 DEGREES
VENTRICULAR RATE- MUSE: 66 BPM

## 2024-08-12 NOTE — DISCHARGE INSTRUCTIONS
Here in the emergency room have reassuring evaluation including her basic blood work, EKG chest x-ray and head CT scan.  We discussed your symptoms appear to be more likely syncope versus seizure however you should follow-up with neurology for further evaluation.  We discussed no driving until you follow-up with neurology.  I placed a referral and they will be calling you.    We also discussed the need to follow-up with primary care as well for additional outpatient workup.  I placed a referral and they should be calling you.    If you develop any new or worsening symptoms including any recurrent episodes need to return back to the emergency room for further evaluation and management.  Consider decreasing alcohol intake this may have been a provoking factor.    We discussed that only the preliminary reads on the x-ray and the CT scan of her back and it may be sometime before the final reads come in, you would like to leave the emergency department prior to the final reads coming and I will call you if there are any abnormal over reads with t that understanding.    If you develop any new or worsening symptoms, is important to return right away to the emergency department for further evaluation and management.

## 2024-08-14 ASSESSMENT — PATIENT HEALTH QUESTIONNAIRE - PHQ9
10. IF YOU CHECKED OFF ANY PROBLEMS, HOW DIFFICULT HAVE THESE PROBLEMS MADE IT FOR YOU TO DO YOUR WORK, TAKE CARE OF THINGS AT HOME, OR GET ALONG WITH OTHER PEOPLE: SOMEWHAT DIFFICULT
SUM OF ALL RESPONSES TO PHQ QUESTIONS 1-9: 7
SUM OF ALL RESPONSES TO PHQ QUESTIONS 1-9: 7

## 2024-08-15 ENCOUNTER — OFFICE VISIT (OUTPATIENT)
Dept: FAMILY MEDICINE | Facility: CLINIC | Age: 24
End: 2024-08-15
Payer: COMMERCIAL

## 2024-08-15 ENCOUNTER — ORDERS ONLY (AUTO-RELEASED) (OUTPATIENT)
Dept: FAMILY MEDICINE | Facility: CLINIC | Age: 24
End: 2024-08-15

## 2024-08-15 VITALS
TEMPERATURE: 96.9 F | DIASTOLIC BLOOD PRESSURE: 60 MMHG | WEIGHT: 168.7 LBS | RESPIRATION RATE: 16 BRPM | HEART RATE: 60 BPM | OXYGEN SATURATION: 97 % | SYSTOLIC BLOOD PRESSURE: 100 MMHG | BODY MASS INDEX: 22.26 KG/M2

## 2024-08-15 DIAGNOSIS — R55 SYNCOPE, UNSPECIFIED SYNCOPE TYPE: Primary | ICD-10-CM

## 2024-08-15 DIAGNOSIS — R55 SYNCOPE, UNSPECIFIED SYNCOPE TYPE: ICD-10-CM

## 2024-08-15 PROCEDURE — 99214 OFFICE O/P EST MOD 30 MIN: CPT | Performed by: NURSE PRACTITIONER

## 2024-08-15 RX ORDER — LEVETIRACETAM 500 MG/1
500 TABLET ORAL 2 TIMES DAILY
Qty: 60 TABLET | Refills: 2 | Status: SHIPPED | OUTPATIENT
Start: 2024-08-15

## 2024-08-15 ASSESSMENT — PAIN SCALES - GENERAL: PAINLEVEL: NO PAIN (0)

## 2024-08-15 NOTE — PROGRESS NOTES
Answers submitted by the patient for this visit:  Patient Health Questionnaire (Submitted on 8/14/2024)  If you checked off any problems, how difficult have these problems made it for you to do your work, take care of things at home, or get along with other people?: Somewhat difficult  PHQ9 TOTAL SCORE: 7    Assessment & Plan     Syncope, unspecified syncope type  -possibly due to seizure, patient states he was sitting in chair when episode happened and states his family told him he was unconscious for about a minute and had seizure like jerking movements, he also states he did bit his tongue. Patient reports he was camping with his family and he was sitting by bonfire, he had couple alcoholic beverages before event. Denies having chest pains, palpitations, or any other concerning symptoms before he passed out. Possibly due to alcohol? Possible seizure based on symptoms during event I recommended ZIO patch study for additional evaluation, start Keppra, no driving for 3 months. Recheck Keppra level in 1 month and follow up with neurologist   - ZIO PATCH MAIL OUT; Future  - levETIRAcetam (KEPPRA) 500 MG tablet; Take 1 tablet (500 mg) by mouth 2 times daily  - Keppra (Levetiracetam) Level; Future      Subjective   Vasquez is a 23 year old, presenting for the following health issues:  RECHECK ( E R follow up )        8/15/2024     8:21 AM   Additional Questions   Roomed by adri   Accompanied by self         8/15/2024     8:21 AM   Patient Reported Additional Medications   Patient reports taking the following new medications none     HPI       ED/UC Followup:    Facility:  [facility]   Date of visit: 8/11/24  Reason for visit: Syncope   Current Status: Hasn't fainted since       Review of Systems  Constitutional, HEENT, cardiovascular, pulmonary, gi and gu systems are negative, except as otherwise noted.      Objective    /60   Pulse 60   Temp 96.9  F (36.1  C) (Tympanic)   Resp 16   Wt 76.5 kg (168 lb 11.2 oz)    SpO2 97%   BMI 22.26 kg/m    Body mass index is 22.26 kg/m .  Physical Exam   GENERAL: alert and no distress  EYES: Eyes grossly normal to inspection, PERRL and conjunctivae and sclerae normal  RESP: lungs clear to auscultation - no rales, rhonchi or wheezes  CV: regular rate and rhythm, normal S1 S2, no S3 or S4, no murmur, click or rub, no peripheral edema   MS: no gross musculoskeletal defects noted, no edema  SKIN: no suspicious lesions or rashes  NEURO: Normal strength and tone, mentation intact and speech normal  PSYCH: mentation appears normal, affect normal/bright            Signed Electronically by: LACEY Vazquez CNP

## 2024-08-27 ENCOUNTER — OFFICE VISIT (OUTPATIENT)
Dept: ALLERGY | Facility: OTHER | Age: 24
End: 2024-08-27
Payer: COMMERCIAL

## 2024-08-27 VITALS
WEIGHT: 169.75 LBS | HEART RATE: 97 BPM | SYSTOLIC BLOOD PRESSURE: 109 MMHG | OXYGEN SATURATION: 97 % | DIASTOLIC BLOOD PRESSURE: 74 MMHG | BODY MASS INDEX: 22.4 KG/M2

## 2024-08-27 DIAGNOSIS — J30.1 SEASONAL ALLERGIC RHINITIS DUE TO POLLEN: Primary | ICD-10-CM

## 2024-08-27 DIAGNOSIS — H10.13 ALLERGIC CONJUNCTIVITIS OF BOTH EYES: ICD-10-CM

## 2024-08-27 DIAGNOSIS — J30.81 ALLERGIC RHINITIS DUE TO ANIMALS: ICD-10-CM

## 2024-08-27 DIAGNOSIS — J34.2 DNS (DEVIATED NASAL SEPTUM): ICD-10-CM

## 2024-08-27 PROCEDURE — 99204 OFFICE O/P NEW MOD 45 MIN: CPT | Mod: 25 | Performed by: ALLERGY & IMMUNOLOGY

## 2024-08-27 PROCEDURE — 95004 PERQ TESTS W/ALRGNC XTRCS: CPT | Performed by: ALLERGY & IMMUNOLOGY

## 2024-08-27 RX ORDER — AZELASTINE HYDROCHLORIDE 0.5 MG/ML
1 SOLUTION/ DROPS OPHTHALMIC 2 TIMES DAILY PRN
Qty: 6 ML | Refills: 3 | Status: SHIPPED | OUTPATIENT
Start: 2024-08-27

## 2024-08-27 RX ORDER — AZELASTINE 1 MG/ML
2 SPRAY, METERED NASAL 2 TIMES DAILY PRN
Qty: 30 ML | Refills: 3 | Status: SHIPPED | OUTPATIENT
Start: 2024-08-27

## 2024-08-27 RX ORDER — FLUTICASONE PROPIONATE 50 MCG
2 SPRAY, SUSPENSION (ML) NASAL DAILY
Qty: 16 G | Refills: 3 | Status: SHIPPED | OUTPATIENT
Start: 2024-08-27

## 2024-08-27 NOTE — PROGRESS NOTES
SUBJECTIVE:                                                                   Vasquez Rizvi presents today to our Allergy Clinic at [facility] for a new patient visit. He is a 23 year old male with concerns for seasonal/environmental allergies.     Vasquez has been experiencing symptoms of sneezing, runny nose, itchy and watery eyes, and nasal congestion for several years, with clear nasal discharge. These symptoms have worsened over the past couple of years, and Vasquez rates the severity as a 6 out of 10. Although the symptoms are present year-round, they tend to be worse during the summer. Exposure to animals, particularly dogs and cats, as well as dust, exacerbates the symptoms. Vasquez has tried over-the-counter allergy medications, which provide some relief but do not fully alleviate the symptoms. Vasquez has not used nasal sprays and has no history of surgeries, including ear tubes, tonsillectomy, or adenoidectomy.           Patient Active Problem List   Diagnosis    Abnormal EKG    Acne    Attention deficit hyperactivity disorder (ADHD)    Deliberate self-cutting    High risk medication use    Overdose of CNS stimulant (H)    Polysubstance dependence including opioid type drug, episodic abuse (H)    Severe episode of recurrent major depressive disorder, with psychotic features (H)    Autism disorder       Past Medical History:   Diagnosis Date    Asperger's disorder     Depressive disorder     Generalized anxiety disorder       Problem (# of Occurrences) Relation (Name,Age of Onset)    Depression (3) Maternal Aunt, Maternal Uncle, Cousin    Diabetes (1) Paternal Grandmother    Colon Polyps (1) Mother    Obsessive Compulsive Disorder (3) Maternal Aunt, Maternal Uncle, Cousin    Diabetes Type 2  (1) Paternal Grandmother          No past surgical history on file.  Social History     Socioeconomic History    Marital status: Single     Spouse name: None    Number of children: None    Years of  education: None    Highest education level: None   Tobacco Use    Smoking status: Some Days     Current packs/day: 0.00     Average packs/day: 0.5 packs/day for 2.0 years (1.0 ttl pk-yrs)     Types: Cigarettes     Start date: 7/15/2015     Last attempt to quit: 7/15/2017     Years since quittin.1    Smokeless tobacco: Former     Types: Chew    Tobacco comments:     Only chewed a couple of times   Vaping Use    Vaping status: Former    Substances: Nicotine, THC, Flavoring    Devices: Disposable   Substance and Sexual Activity    Alcohol use: Yes     Comment: occasional    Drug use: Yes     Types: Marijuana, Amphetamines, Hydrocodone, Oxycodone     Comment: currently doing adderall    Sexual activity: Not Currently     Partners: Female   Social History Narrative    2024        ENVIRONMENTAL HISTORY: [place] is heated with a forced air. They does have central air conditioning. The patient's bedroom is furnished with carpeting in bedroom and fabric window coverings.  Pets inside the house include 1 cat(s). There is no history of cockroach or mice infestation. There is/are 2 smokers in the house.  The house does not have a damp basement.      Social Determinants of Health     Financial Resource Strain: Low Risk  (11/3/2023)    Financial Resource Strain     Within the past 12 months, have you or your family members you live with been unable to get utilities (heat, electricity) when it was really needed?: No   Food Insecurity: Unknown (11/3/2023)    Food Insecurity     Within the past 12 months, did you worry that your food would run out before you got money to buy more?: Patient refused     Within the past 12 months, did the food you bought just not last and you didn t have money to get more?: Patient refused   Transportation Needs: Low Risk  (11/3/2023)    Transportation Needs     Within the past 12 months, has lack of transportation kept you from  medical appointments, getting your medicines, non-medical meetings or appointments, work, or from getting things that you need?: No   Interpersonal Safety: Low Risk  (8/15/2024)    Interpersonal Safety     Do you feel physically and emotionally safe where you currently live?: Yes     Within the past 12 months, have you been hit, slapped, kicked or otherwise physically hurt by someone?: No     Within the past 12 months, have you been humiliated or emotionally abused in other ways by your partner or ex-partner?: No   Housing Stability: Low Risk  (11/3/2023)    Housing Stability     Do you have housing? : Yes     Are you worried about losing your housing?: No               Current Outpatient Medications:     azelastine (ASTELIN) 0.1 % nasal spray, Spray 2 sprays into both nostrils 2 times daily as needed for rhinitis., Disp: 30 mL, Rfl: 3    azelastine (OPTIVAR) 0.05 % ophthalmic solution, Apply 1 drop to eye 2 times daily as needed (itchy/watery eyes)., Disp: 6 mL, Rfl: 3    fluticasone (FLONASE) 50 MCG/ACT nasal spray, Spray 2 sprays into both nostrils daily., Disp: 16 g, Rfl: 3    levETIRAcetam (KEPPRA) 500 MG tablet, Take 1 tablet (500 mg) by mouth 2 times daily, Disp: 60 tablet, Rfl: 2    lisdexamfetamine (VYVANSE) 30 MG capsule, Take 1 capsule (30 mg) by mouth every morning (Patient not taking: Reported on 8/15/2024), Disp: 30 capsule, Rfl: 0    lisdexamfetamine (VYVANSE) 30 MG capsule, Take 1 capsule (30 mg) by mouth every morning (Patient not taking: Reported on 8/15/2024), Disp: 30 capsule, Rfl: 0    Current Facility-Administered Medications:     0.5 mL ropivacaine (NAROPIN) injection 5 mg/mL, 0.5 mL, , , Mick Jameson MD, 0.5 mL at 12/15/23 1021    betamethasone acet & sod phos (CELESTONE) injection 6 mg, 6 mg, , , Mick Jameson MD, 6 mg at 12/15/23 1021  Immunization History   Administered Date(s) Administered    COVID-19 MONOVALENT 12+ (Pfizer) 08/23/2021, 09/13/2021    Comvax (HIB/HepB)  02/07/2000, 03/30/2000, 12/11/2000    DTAP (<7y) 02/07/2000, 03/30/2000, 05/25/2000, 03/14/2001, 08/15/2005    C2m5-98 Novel Flu 12/08/2009    HEPATITIS A (PEDS 12M-18Y) 08/28/2014, 09/04/2015    HPV9 09/04/2015, 11/13/2015, 11/02/2016    Influenza (H1N1) 12/08/2009    Influenza (IIV3) PF 12/16/2003, 02/12/2004, 11/08/2005, 02/12/2007, 12/08/2009, 08/31/2012    Influenza Vaccine >6 months,quad, PF 10/22/2014, 09/15/2015, 11/02/2016, 12/01/2022    Influenza, seasonal, injectable, PF 12/08/2009, 12/06/2011, 01/03/2014    MMR 03/14/2001, 08/15/2005    Mantoux Tuberculin Skin Test 04/19/2016    Meningococcal ACWY (Menveo ) 08/28/2014, 11/02/2016    Poliovirus, inactivated (IPV) 02/07/2000, 03/30/2000, 03/14/2001, 08/15/2005    TDAP (Adacel,Boostrix) 08/24/2011, 12/01/2022    Varicella 12/11/2000, 08/24/2011     Allergies   Allergen Reactions    Adhesive Tape Rash     OBJECTIVE:                                                                 /74   Pulse 97   Wt 77 kg (169 lb 12.1 oz)   SpO2 97%   BMI 22.40 kg/m              Physical Exam  Vitals and nursing note reviewed.   Constitutional:       General: He is not in acute distress.     Appearance: He is not diaphoretic.   HENT:      Head: Normocephalic and atraumatic.      Right Ear: Tympanic membrane, ear canal and external ear normal.      Left Ear: Tympanic membrane, ear canal and external ear normal.      Nose: Septal deviation (rightward) and mucosal edema present. No rhinorrhea.      Right Turbinates: Enlarged and swollen.      Left Turbinates: Enlarged and swollen.      Mouth/Throat:      Lips: Pink.      Mouth: Mucous membranes are moist.      Pharynx: Oropharynx is clear. No pharyngeal swelling, oropharyngeal exudate or posterior oropharyngeal erythema.   Eyes:      General:         Right eye: No discharge.         Left eye: No discharge.      Conjunctiva/sclera: Conjunctivae normal.   Cardiovascular:      Rate and Rhythm: Normal rate.      Heart  sounds: Normal heart sounds. No murmur heard.  Pulmonary:      Effort: Pulmonary effort is normal. No respiratory distress.      Breath sounds: Normal breath sounds and air entry. No stridor, decreased air movement or transmitted upper airway sounds. No decreased breath sounds, wheezing, rhonchi or rales.   Neurological:      Mental Status: He is alert and oriented to person, place, and time.   Psychiatric:         Mood and Affect: Mood normal.         Behavior: Behavior normal.              WORKUP:       At today's visit, the patient and I engaged in an informed consent discussion about allergy testing.  We discussed skin testing, blood testing, and the alternative of not undergoing any testing. The patient has a preference for skin testing. We then discussed the risks and benefits of skin testing.  The patient understands skin testing risks can include, but are not limited to, urticaria, angioedema, shortness of breath, and severe anaphylaxis.  The benefits include, but are not limited, to evaluation for allergens causing symptoms.  After answering the patient's questions they have agreed to proceed with skin testing.    ENVIRONMENTAL PERCUTANEOUS SKIN TESTING: ADULT      8/27/2024     1:00 PM   [facility]   Consent Y   Ordering Physician Yusuf   Interpreting Physician Yusuf   Testing Technician Patt   Location Back   Time start: 13:35   Time End: 13:50   Positive Control: Histatrol*ALK 1 mg/ml 4/15   Negative Control: 50% Glycerin 0   Cat Hair*ALK (10,000 BAU/ml) 4/8   AP Dog Hair/Dander (1:100 w/v) 12/25   Dust Mite p. 30,000 AU/ml 0   Dust Mite f. (30,000 AU/ml) 0   Sumeet (W/F in millimeters) 0   Jovanni Grass (100,000 BAU/mL) 6/20   Red Amite (W/F in millimeters) 0   Maple/Dale (W/F in millimeters) 0   Hackberry (W/F in millimeters) 0   Bartholomew (W/F in millimeters) 0   Bandera *ALK (W/F in millimeters) 0   American Elm (W/F in millimeters) 0   Chelan (W/F in millimeters) 0   Black  Clear (W/F in millimeters) 0   Birch Mix (W/F in millimeters) 0   [place] (W/F in millimeters) 0   Oak (W/F in millimeters) 0   Cocklebur (W/F in millimeters) 0   Hendley (W/F in millimeters) 0   White Jean[place]Pierre (W/F in millimeters) 0   Careless (W/F in millimeters) 0   Nettle (W/F in millimeters) 0   English Plantain (W/F in millimeters) 0   Kochia (W/F in millimeters) 0   Lamb's Quarter (W/F in millimeters) 0   Marshelder (W/F in millimeters) 0   Ragweed Mix* ALK (W/F in millimeters) 0   Russian Thistle (W/F in millimeters) 0   Sagebrush/Mugwort (W/F in millimeters) 0   Sheep Sorrel (W/F in millimeters) 0   Feather Mix* ALK (W/F in millimeters) 0   Penicillium Mix (1:10 w/v) 0   Curvularia spicifera (1:10 w/v) 0   Epicoccum (1:10 w/v) 0   Aspergillus fumigatus (1:10 w/v): 0   Alternaria tenius (1:10 w/v) 0   H. Cladosporium (1:10 w/v) 0   Phoma herbarum (1:10 w/v) 0           My interpretation: SPT for aeroallergens performed today (August 27, 2024) showed sensitivity to cat, dog, and grass pollen.  The rest was negative with appropriate responses to positive and negative controls.      ASSESSMENT/PLAN:         Allergic rhinoconjunctivitis    Complicated by deviated nasal septum.  Avoidance measures were discussed, and information was provided based on the skin test results.  - Use intranasal fluticasone (Flonase) 1-2 sprays in each nostril once daily.  - Add azelastine nasal spray, 2 sprays in each nostril twice daily as needed.  - Use Optivar 1 drop in each eye twice daily as needed.  If symptoms persist despite medications and allergen avoidance, or if medications are not tolerated, allergen immunotherapy is recommended.   We briefly discussed allergen immunotherapy today.  Also, depending on symptom control, may consider evaluation by ENT for deviated nasal septum.    - ALLERGY SKIN TESTS,ALLERGENS  - fluticasone (FLONASE) 50 MCG/ACT nasal spray  Dispense: 16 g; Refill: 3  - azelastine (ASTELIN) 0.1 % nasal spray   Dispense: 30 mL; Refill: 3  - azelastine (OPTIVAR) 0.05 % ophthalmic solution  Dispense: 6 mL; Refill: 3     Follow-up in 2 months or sooner if needed.    Thank you for allowing us to participate in the care of this patient. Please feel free to contact us if there are any questions or concerns about the patient.    Disclaimer: This note consists of symbols derived from keyboarding, dictation and/or voice recognition software. As a result, there may be errors in the script that have gone undetected. Please consider this when interpreting information found in this chart.        Boris Goldberg MD, FAAAAI, FACAAI  Allergy and Asthma     [facility]

## 2024-08-27 NOTE — PATIENT INSTRUCTIONS
-Start intranasal fluticasone (Flonase) 1-2 sprays in each nostril once daily.  -Start azelastine nasal spray, 2 sprays in each nostril twice daily as needed.   Optivar 1 drop in each eye twice daily as needed.     If symptoms persist despite medications and allergen avoidance, or if medications are not tolerated, allergen immunotherapy is recommended. We briefly discussed allergen immunotherapy today.     Allergen immunotherapy, also known as allergy shots, is a long-term treatment that decreases symptoms for many people with allergic rhinitis, allergic asthma, or allergic conjunctivitis. It involves gradually increasing doses of allergens to build up tolerance and reduce sensitivity, effectively altering the immune system's response to them.

## 2024-08-27 NOTE — LETTER
8/27/2024      Vasquez Rizvi  [address]  [facility] [address]      Dear Colleague,    Thank you for referring your patient, Vasquez Rizvi, to the [facility]. Please see a copy of my visit note below.    SUBJECTIVE:                                                                   Vasquez Rizvi presents today to our Allergy Clinic at [facility] for a new patient visit. He is a 23 year old male with concerns for seasonal/environmental allergies.     Vasquez has been experiencing symptoms of sneezing, runny nose, itchy and watery eyes, and nasal congestion for several years, with clear nasal discharge. These symptoms have worsened over the past couple of years, and Vasquez rates the severity as a 6 out of 10. Although the symptoms are present year-round, they tend to be worse during the summer. Exposure to animals, particularly dogs and cats, as well as dust, exacerbates the symptoms. Vasquez has tried over-the-counter allergy medications, which provide some relief but do not fully alleviate the symptoms. Vasquez has not used nasal sprays and has no history of surgeries, including ear tubes, tonsillectomy, or adenoidectomy.           Patient Active Problem List   Diagnosis     Abnormal EKG     Acne     Attention deficit hyperactivity disorder (ADHD)     Deliberate self-cutting     High risk medication use     Overdose of CNS stimulant (H)     Polysubstance dependence including opioid type drug, episodic abuse (H)     Severe episode of recurrent major depressive disorder, with psychotic features (H)     Autism disorder       Past Medical History:   Diagnosis Date     Asperger's disorder      Depressive disorder      Generalized anxiety disorder       Problem (# of Occurrences) Relation (Name,Age of Onset)    Depression (3) Maternal Aunt, Maternal Uncle, Cousin    Diabetes (1) Paternal Grandmother    Colon Polyps (1) Mother    Obsessive Compulsive Disorder (3) Maternal Aunt,  Maternal Uncle, Cousin    Diabetes Type 2  (1) Paternal Grandmother          No past surgical history on file.  Social History     Socioeconomic History     Marital status: Single     Spouse name: None     Number of children: None     Years of education: None     Highest education level: None   Tobacco Use     Smoking status: Some Days     Current packs/day: 0.00     Average packs/day: 0.5 packs/day for 2.0 years (1.0 ttl pk-yrs)     Types: Cigarettes     Start date: 7/15/2015     Last attempt to quit: 7/15/2017     Years since quittin.1     Smokeless tobacco: Former     Types: Chew     Tobacco comments:     Only chewed a couple of times   Vaping Use     Vaping status: Former     Substances: Nicotine, THC, Flavoring     Devices: Disposable   Substance and Sexual Activity     Alcohol use: Yes     Comment: occasional     Drug use: Yes     Types: Marijuana, Amphetamines, Hydrocodone, Oxycodone     Comment: currently doing adderall     Sexual activity: Not Currently     Partners: Female   Social History Narrative    2024        ENVIRONMENTAL HISTORY: [place] is heated with a forced air. They does have central air conditioning. The patient's bedroom is furnished with carpeting in bedroom and fabric window coverings.  Pets inside the house include 1 cat(s). There is no history of cockroach or mice infestation. There is/are 2 smokers in the house.  The house does not have a damp basement.      Social Determinants of Health     Financial Resource Strain: Low Risk  (11/3/2023)    Financial Resource Strain      Within the past 12 months, have you or your family members you live with been unable to get utilities (heat, electricity) when it was really needed?: No   Food Insecurity: Unknown (11/3/2023)    Food Insecurity      Within the past 12 months, did you worry that your food would run out before you got money to buy more?: Patient refused      Within the  past 12 months, did the food you bought just not last and you didn t have money to get more?: Patient refused   Transportation Needs: Low Risk  (11/3/2023)    Transportation Needs      Within the past 12 months, has lack of transportation kept you from medical appointments, getting your medicines, non-medical meetings or appointments, work, or from getting things that you need?: No   Interpersonal Safety: Low Risk  (8/15/2024)    Interpersonal Safety      Do you feel physically and emotionally safe where you currently live?: Yes      Within the past 12 months, have you been hit, slapped, kicked or otherwise physically hurt by someone?: No      Within the past 12 months, have you been humiliated or emotionally abused in other ways by your partner or ex-partner?: No   Housing Stability: Low Risk  (11/3/2023)    Housing Stability      Do you have housing? : Yes      Are you worried about losing your housing?: No               Current Outpatient Medications:      azelastine (ASTELIN) 0.1 % nasal spray, Spray 2 sprays into both nostrils 2 times daily as needed for rhinitis., Disp: 30 mL, Rfl: 3     azelastine (OPTIVAR) 0.05 % ophthalmic solution, Apply 1 drop to eye 2 times daily as needed (itchy/watery eyes)., Disp: 6 mL, Rfl: 3     fluticasone (FLONASE) 50 MCG/ACT nasal spray, Spray 2 sprays into both nostrils daily., Disp: 16 g, Rfl: 3     levETIRAcetam (KEPPRA) 500 MG tablet, Take 1 tablet (500 mg) by mouth 2 times daily, Disp: 60 tablet, Rfl: 2     lisdexamfetamine (VYVANSE) 30 MG capsule, Take 1 capsule (30 mg) by mouth every morning (Patient not taking: Reported on 8/15/2024), Disp: 30 capsule, Rfl: 0     lisdexamfetamine (VYVANSE) 30 MG capsule, Take 1 capsule (30 mg) by mouth every morning (Patient not taking: Reported on 8/15/2024), Disp: 30 capsule, Rfl: 0    Current Facility-Administered Medications:      0.5 mL ropivacaine (NAROPIN) injection 5 mg/mL, 0.5 mL, , , Mick Jameson MD, 0.5 mL at 12/15/23  1021     betamethasone acet & sod phos (CELESTONE) injection 6 mg, 6 mg, , , Mick Jameson MD, 6 mg at 12/15/23 1021  Immunization History   Administered Date(s) Administered     COVID-19 MONOVALENT 12+ (Pfizer) 08/23/2021, 09/13/2021     Comvax (HIB/HepB) 02/07/2000, 03/30/2000, 12/11/2000     DTAP (<7y) 02/07/2000, 03/30/2000, 05/25/2000, 03/14/2001, 08/15/2005     [id number] Novel Flu 12/08/2009     HEPATITIS A (PEDS 12M-18Y) 08/28/2014, 09/04/2015     HPV9 09/04/2015, 11/13/2015, 11/02/2016     Influenza (H1N1) 12/08/2009     Influenza (IIV3) PF 12/16/2003, 02/12/2004, 11/08/2005, 02/12/2007, 12/08/2009, 08/31/2012     Influenza Vaccine >6 months,quad, PF 10/22/2014, 09/15/2015, 11/02/2016, 12/01/2022     Influenza, seasonal, injectable, PF 12/08/2009, 12/06/2011, 01/03/2014     MMR 03/14/2001, 08/15/2005     Mantoux Tuberculin Skin Test 04/19/2016     Meningococcal ACWY (Menveo ) 08/28/2014, 11/02/2016     Poliovirus, inactivated (IPV) 02/07/2000, 03/30/2000, 03/14/2001, 08/15/2005     TDAP (Adacel,Boostrix) 08/24/2011, 12/01/2022     Varicella 12/11/2000, 08/24/2011     Allergies   Allergen Reactions     Adhesive Tape Rash     OBJECTIVE:                                                                 /74   Pulse 97   Wt 77 kg (169 lb 12.1 oz)   SpO2 97%   BMI 22.40 kg/m              Physical Exam  Vitals and nursing note reviewed.   Constitutional:       General: He is not in acute distress.     Appearance: He is not diaphoretic.   HENT:      Head: Normocephalic and atraumatic.      Right Ear: Tympanic membrane, ear canal and external ear normal.      Left Ear: Tympanic membrane, ear canal and external ear normal.      Nose: Septal deviation (rightward) and mucosal edema present. No rhinorrhea.      Right Turbinates: Enlarged and swollen.      Left Turbinates: Enlarged and swollen.      Mouth/Throat:      Lips: Pink.      Mouth: Mucous membranes are moist.      Pharynx: Oropharynx is clear. No  pharyngeal swelling, oropharyngeal exudate or posterior oropharyngeal erythema.   Eyes:      General:         Right eye: No discharge.         Left eye: No discharge.      Conjunctiva/sclera: Conjunctivae normal.   Cardiovascular:      Rate and Rhythm: Normal rate.      Heart sounds: Normal heart sounds. No murmur heard.  Pulmonary:      Effort: Pulmonary effort is normal. No respiratory distress.      Breath sounds: Normal breath sounds and air entry. No stridor, decreased air movement or transmitted upper airway sounds. No decreased breath sounds, wheezing, rhonchi or rales.   Neurological:      Mental Status: He is alert and oriented to person, place, and time.   Psychiatric:         Mood and Affect: Mood normal.         Behavior: Behavior normal.              WORKUP:       At today's visit, the patient and I engaged in an informed consent discussion about allergy testing.  We discussed skin testing, blood testing, and the alternative of not undergoing any testing. The patient has a preference for skin testing. We then discussed the risks and benefits of skin testing.  The patient understands skin testing risks can include, but are not limited to, urticaria, angioedema, shortness of breath, and severe anaphylaxis.  The benefits include, but are not limited, to evaluation for allergens causing symptoms.  After answering the patient's questions they have agreed to proceed with skin testing.    ENVIRONMENTAL PERCUTANEOUS SKIN TESTING: ADULT      8/27/2024     1:00 PM   [facility]   Consent Y   Ordering Physician Yusuf   Interpreting Physician Yusuf   Testing Technician Patt   Location Back   Time start: 13:35   Time End: 13:50   Positive Control: Histatrol*ALK 1 mg/ml 4/15   Negative Control: 50% Glycerin 0   Cat Hair*ALK (10,000 BAU/ml) 4/8   AP Dog Hair/Dander (1:100 w/v) 12/25   Dust Mite p. 30,000 AU/ml 0   Dust Mite f. (30,000 AU/ml) 0   Sumeet (W/F in millimeters) 0   Jovanni Grass (100,000  BAU/mL) 6/20   Red Daykin (W/F in millimeters) 0   Maple/[place] (W/F in millimeters) 0   Hackberry (W/F in millimeters) 0   Bimble (W/F in millimeters) 0   [place] *ALK (W/F in millimeters) 0   American Elm (W/F in millimeters) 0   [place] (W/F in millimeters) 0   Black [place] (W/F in millimeters) 0   Birch Mix (W/F in millimeters) 0   Fannin (W/F in millimeters) 0   Oak (W/F in millimeters) 0   Cocklebur (W/F in millimeters) 0   [place] (W/F in millimeters) 0   White [place] (W/F in millimeters) 0   Careless (W/F in millimeters) 0   Nettle (W/F in millimeters) 0   English Plantain (W/F in millimeters) 0   Kochia (W/F in millimeters) 0   Lamb's Quarter (W/F in millimeters) 0   Marshelder (W/F in millimeters) 0   Ragweed Mix* ALK (W/F in millimeters) 0   Russian Thistle (W/F in millimeters) 0   Sagebrush/Mugwort (W/F in millimeters) 0   Sheep Sorrel (W/F in millimeters) 0   Feather Mix* ALK (W/F in millimeters) 0   Penicillium Mix (1:10 w/v) 0   Curvularia spicifera (1:10 w/v) 0   Epicoccum (1:10 w/v) 0   Aspergillus fumigatus (1:10 w/v): 0   Alternaria tenius (1:10 w/v) 0   H. Cladosporium (1:10 w/v) 0   Phoma herbarum (1:10 w/v) 0           My interpretation: SPT for aeroallergens performed today (August 27, 2024) showed sensitivity to cat, dog, and grass pollen.  The rest was negative with appropriate responses to positive and negative controls.      ASSESSMENT/PLAN:         Allergic rhinoconjunctivitis    Complicated by deviated nasal septum.  Avoidance measures were discussed, and information was provided based on the skin test results.  - Use intranasal fluticasone (Flonase) 1-2 sprays in each nostril once daily.  - Add azelastine nasal spray, 2 sprays in each nostril twice daily as needed.  - Use Optivar 1 drop in each eye twice daily as needed.  If symptoms persist despite medications and allergen avoidance, or if medications are not tolerated, allergen immunotherapy is recommended.   We briefly  discussed allergen immunotherapy today.  Also, depending on symptom control, may consider evaluation by ENT for deviated nasal septum.    - ALLERGY SKIN TESTS,ALLERGENS  - fluticasone (FLONASE) 50 MCG/ACT nasal spray  Dispense: 16 g; Refill: 3  - azelastine (ASTELIN) 0.1 % nasal spray  Dispense: 30 mL; Refill: 3  - azelastine (OPTIVAR) 0.05 % ophthalmic solution  Dispense: 6 mL; Refill: 3     Follow-up in 2 months or sooner if needed.    Thank you for allowing us to participate in the care of this patient. Please feel free to contact us if there are any questions or concerns about the patient.    Disclaimer: This note consists of symbols derived from keyboarding, dictation and/or voice recognition software. As a result, there may be errors in the script that have gone undetected. Please consider this when interpreting information found in this chart.        Boris Goldberg MD, FAAAAI, FACANSLEYI  Allergy and Asthma     [facility]        Again, thank you for allowing me to participate in the care of your patient.        Sincerely,        Boris Goldberg MD

## 2024-08-28 ENCOUNTER — TELEPHONE (OUTPATIENT)
Dept: ALLERGY | Facility: CLINIC | Age: 24
End: 2024-08-28
Payer: COMMERCIAL

## 2024-08-28 DIAGNOSIS — H10.13 ALLERGIC CONJUNCTIVITIS OF BOTH EYES: Primary | ICD-10-CM

## 2024-08-28 NOTE — TELEPHONE ENCOUNTER
Prior Authorization Retail Medication Request    Medication/Dose: Azelastine  Diagnosis and ICD code (if different than what is on RX):    New/renewal/insurance change PA/secondary ins. PA:  Previously Tried and Failed:    Rationale:      Insurance   Primary: [employer]  Insurance ID:  [id number]    Secondary (if applicable):  Insurance ID:      Pharmacy Information (if different than what is on RX)  Name:  [facility]  Phone:  [phone]  Fax: [phone]    Thank you,   Ike العلي, PhT  [facility]

## 2024-08-29 NOTE — TELEPHONE ENCOUNTER
PA Initiation    Medication: AZELASTINE HCL 0.05 % OP SOLN  Insurance Company: [employer] Non-Specialty PA's - Phone [phone] Fax [phone]  Pharmacy Filling the Rx: [place], [place] - [address] [place] [address]  Filling Pharmacy Phone: [phone]  Start Date: 8/29/2024

## 2024-08-29 NOTE — TELEPHONE ENCOUNTER
PRIOR AUTHORIZATION DENIED    Medication: AZELASTINE HCL 0.05 % OP SOLN  Insurance Company: [employer] Non-Specialty PA's - Phone [phone] Fax [phone]  Denial Date: 8/29/2024  Denial Reason(s): Plan prefers alternative formulary medications, see preferred options in denial     Appeal Information:     Patient Notified: No

## 2024-08-30 RX ORDER — OLOPATADINE HYDROCHLORIDE 2 MG/ML
SOLUTION/ DROPS OPHTHALMIC
Qty: 2.5 ML | Refills: 3 | Status: SHIPPED | OUTPATIENT
Start: 2024-08-30

## 2024-08-30 NOTE — TELEPHONE ENCOUNTER
I will prescribe olopatadine 0.2% and 1 drop in each eye once daily as needed.    Boris Goldberg MD

## 2024-09-16 ENCOUNTER — TELEPHONE (OUTPATIENT)
Dept: FAMILY MEDICINE | Facility: CLINIC | Age: 24
End: 2024-09-16
Payer: COMMERCIAL

## 2024-09-16 NOTE — TELEPHONE ENCOUNTER
Left message for patient to call back. Received a e mail from [employer] regarding Zio Patch patient received 8/15/2024. They stated they never received the zio patch back in the mail.   Questioning if patient ever wore patch and or mailed it back? Please ask patient and send message back to Alessandro ALEXANDER CMA to contact [employer] regarding this information . Thank You - Alessandro LOVETT CMA

## 2024-09-16 NOTE — TELEPHONE ENCOUNTER
I called patient and he stated he just recently received the zio patch in the mail and plan on placin in on this week. I called [employer] and they just asked that patient contact them when he places the patch. Patient was notified to do this. Alessandro LOVETT CMA

## 2024-10-10 ENCOUNTER — MYC REFILL (OUTPATIENT)
Dept: FAMILY MEDICINE | Facility: CLINIC | Age: 24
End: 2024-10-10
Payer: COMMERCIAL

## 2024-10-10 DIAGNOSIS — R55 SYNCOPE, UNSPECIFIED SYNCOPE TYPE: ICD-10-CM

## 2024-10-10 NOTE — TELEPHONE ENCOUNTER
Pending Prescriptions:                       Disp   Refills    levETIRAcetam (KEPPRA) 500 MG tablet      60 tab*2            Sig: Take 1 tablet (500 mg) by mouth 2 times daily.    Routing refill request to provider for review/approval because:  Drug not on the G refill protocol     Saurabh Baeza RN

## 2024-10-11 RX ORDER — LEVETIRACETAM 500 MG/1
500 TABLET ORAL 2 TIMES DAILY
Qty: 60 TABLET | Refills: 2 | Status: SHIPPED | OUTPATIENT
Start: 2024-10-11

## 2024-12-10 ENCOUNTER — MYC REFILL (OUTPATIENT)
Dept: FAMILY MEDICINE | Facility: CLINIC | Age: 24
End: 2024-12-10
Payer: COMMERCIAL

## 2024-12-10 DIAGNOSIS — R55 SYNCOPE, UNSPECIFIED SYNCOPE TYPE: ICD-10-CM

## 2024-12-11 RX ORDER — LEVETIRACETAM 500 MG/1
500 TABLET ORAL 2 TIMES DAILY
Qty: 60 TABLET | Refills: 2 | Status: SHIPPED | OUTPATIENT
Start: 2024-12-11

## 2025-02-11 ENCOUNTER — MYC REFILL (OUTPATIENT)
Dept: FAMILY MEDICINE | Facility: CLINIC | Age: 25
End: 2025-02-11
Payer: COMMERCIAL

## 2025-02-11 DIAGNOSIS — R55 SYNCOPE, UNSPECIFIED SYNCOPE TYPE: ICD-10-CM

## 2025-02-12 RX ORDER — LEVETIRACETAM 500 MG/1
500 TABLET ORAL 2 TIMES DAILY
Qty: 60 TABLET | Refills: 2 | OUTPATIENT
Start: 2025-02-12

## 2025-02-12 RX ORDER — LEVETIRACETAM 500 MG/1
500 TABLET ORAL 2 TIMES DAILY
Qty: 60 TABLET | Refills: 0 | Status: SHIPPED | OUTPATIENT
Start: 2025-02-12

## 2025-02-12 NOTE — TELEPHONE ENCOUNTER
Requested Prescriptions   Pending Prescriptions Disp Refills    levETIRAcetam (KEPPRA) 500 MG tablet 60 tablet 2     Sig: Take 1 tablet (500 mg) by mouth 2 times daily.       There is no refill protocol information for this order       Noted patient did not follow up with neurology and did not recheck keppra level as advised.       Last office visit: 8/15/2024 ; last virtual visit: Visit date not found with prescribing provider:     Future Office Visit:      Julie Behrendt RN

## 2025-02-12 NOTE — CONFIDENTIAL NOTE
Covering for provider who is out of office. Patient is requesting refill on keppra. Prescription sent for levETIRAcetam (KEPPRA) 500 MG tablet; Take 1 tablet (500 mg) by mouth 2 times daily.  Dispense: 60 tablet; Refill: 0    Farideh Cowan MD

## 2025-03-08 ENCOUNTER — HEALTH MAINTENANCE LETTER (OUTPATIENT)
Age: 25
End: 2025-03-08